# Patient Record
Sex: FEMALE | Race: WHITE | HISPANIC OR LATINO | Employment: UNEMPLOYED | URBAN - METROPOLITAN AREA
[De-identification: names, ages, dates, MRNs, and addresses within clinical notes are randomized per-mention and may not be internally consistent; named-entity substitution may affect disease eponyms.]

---
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
## 2018-04-12 ENCOUNTER — HOSPITAL ENCOUNTER (OUTPATIENT)
Dept: LAB | Facility: MEDICAL CENTER | Age: 18
End: 2018-04-12
Attending: EMERGENCY MEDICINE
Payer: MEDICAID

## 2018-04-12 LAB — B-HCG SERPL-ACNC: ABNORMAL MIU/ML (ref 0–5)

## 2018-04-12 PROCEDURE — 36415 COLL VENOUS BLD VENIPUNCTURE: CPT

## 2018-04-12 PROCEDURE — 84702 CHORIONIC GONADOTROPIN TEST: CPT

## 2018-05-21 ENCOUNTER — INITIAL PRENATAL (OUTPATIENT)
Dept: OBGYN | Facility: CLINIC | Age: 18
End: 2018-05-21
Payer: MEDICAID

## 2018-05-21 ENCOUNTER — HOSPITAL ENCOUNTER (OUTPATIENT)
Dept: LAB | Facility: MEDICAL CENTER | Age: 18
End: 2018-05-21
Attending: NURSE PRACTITIONER
Payer: MEDICAID

## 2018-05-21 VITALS
HEIGHT: 64 IN | SYSTOLIC BLOOD PRESSURE: 120 MMHG | DIASTOLIC BLOOD PRESSURE: 60 MMHG | BODY MASS INDEX: 27.49 KG/M2 | WEIGHT: 161 LBS

## 2018-05-21 DIAGNOSIS — F12.91 HISTORY OF MARIJUANA USE: ICD-10-CM

## 2018-05-21 DIAGNOSIS — Z34.80 SUPERVISION OF OTHER NORMAL PREGNANCY, ANTEPARTUM: ICD-10-CM

## 2018-05-21 DIAGNOSIS — Z34.80 SUPERVISION OF OTHER NORMAL PREGNANCY, ANTEPARTUM: Primary | ICD-10-CM

## 2018-05-21 DIAGNOSIS — F15.91 HISTORY OF METHAMPHETAMINE USE: ICD-10-CM

## 2018-05-21 PROBLEM — L50.9 URTICARIA: Status: RESOLVED | Noted: 2017-12-13 | Resolved: 2018-05-21

## 2018-05-21 PROBLEM — O03.6 SPONTANEOUS ABORTION COMPLICATED BY DELAYED OR EXCESSIVE HEMORRHAGE: Status: RESOLVED | Noted: 2017-12-13 | Resolved: 2018-05-21

## 2018-05-21 LAB
ABO GROUP BLD: NORMAL
APPEARANCE UR: CLEAR
APPEARANCE UR: NORMAL
BACTERIA #/AREA URNS HPF: NEGATIVE /HPF
BASOPHILS # BLD AUTO: 0.2 % (ref 0–1.8)
BASOPHILS # BLD: 0.02 K/UL (ref 0–0.12)
BILIRUB UR QL STRIP.AUTO: NEGATIVE
BILIRUB UR STRIP-MCNC: NORMAL MG/DL
BLD GP AB SCN SERPL QL: NORMAL
CAOX CRY #/AREA URNS HPF: ABNORMAL /HPF
COLOR UR AUTO: NORMAL
COLOR UR: YELLOW
EOSINOPHIL # BLD AUTO: 0.06 K/UL (ref 0–0.51)
EOSINOPHIL NFR BLD: 0.5 % (ref 0–6.9)
EPI CELLS #/AREA URNS HPF: ABNORMAL /HPF
ERYTHROCYTE [DISTWIDTH] IN BLOOD BY AUTOMATED COUNT: 54.2 FL (ref 35.9–50)
GLUCOSE UR STRIP.AUTO-MCNC: NEGATIVE MG/DL
GLUCOSE UR STRIP.AUTO-MCNC: NEGATIVE MG/DL
HBV SURFACE AG SER QL: NEGATIVE
HCT VFR BLD AUTO: 38.5 % (ref 37–47)
HGB BLD-MCNC: 12 G/DL (ref 12–16)
HIV 1+2 AB+HIV1 P24 AG SERPL QL IA: NON REACTIVE
HYALINE CASTS #/AREA URNS LPF: ABNORMAL /LPF
IMM GRANULOCYTES # BLD AUTO: 0.04 K/UL (ref 0–0.11)
IMM GRANULOCYTES NFR BLD AUTO: 0.3 % (ref 0–0.9)
KETONES UR STRIP.AUTO-MCNC: NEGATIVE MG/DL
KETONES UR STRIP.AUTO-MCNC: NEGATIVE MG/DL
LEUKOCYTE ESTERASE UR QL STRIP.AUTO: ABNORMAL
LEUKOCYTE ESTERASE UR QL STRIP.AUTO: NORMAL
LYMPHOCYTES # BLD AUTO: 2.28 K/UL (ref 1–4.8)
LYMPHOCYTES NFR BLD: 18.9 % (ref 22–41)
MCH RBC QN AUTO: 24.3 PG (ref 27–33)
MCHC RBC AUTO-ENTMCNC: 31.2 G/DL (ref 33.6–35)
MCV RBC AUTO: 78.1 FL (ref 81.4–97.8)
MICRO URNS: ABNORMAL
MONOCYTES # BLD AUTO: 1.11 K/UL (ref 0–0.85)
MONOCYTES NFR BLD AUTO: 9.2 % (ref 0–13.4)
NEUTROPHILS # BLD AUTO: 8.55 K/UL (ref 2–7.15)
NEUTROPHILS NFR BLD: 70.9 % (ref 44–72)
NITRITE UR QL STRIP.AUTO: NEGATIVE
NITRITE UR QL STRIP.AUTO: NEGATIVE
NRBC # BLD AUTO: 0 K/UL
NRBC BLD-RTO: 0 /100 WBC
PH UR STRIP.AUTO: 6 [PH]
PH UR STRIP.AUTO: 6.5 [PH] (ref 5–8)
PLATELET # BLD AUTO: 302 K/UL (ref 164–446)
PMV BLD AUTO: 9.6 FL (ref 9–12.9)
PROT UR QL STRIP: NEGATIVE MG/DL
PROT UR QL STRIP: NEGATIVE MG/DL
RBC # BLD AUTO: 4.93 M/UL (ref 4.2–5.4)
RBC # URNS HPF: ABNORMAL /HPF
RBC UR QL AUTO: NEGATIVE
RBC UR QL AUTO: NEGATIVE
RH BLD: NORMAL
RUBV AB SER QL: 8.2 IU/ML
SP GR UR STRIP.AUTO: 1.02
SP GR UR STRIP.AUTO: 1.02
TREPONEMA PALLIDUM IGG+IGM AB [PRESENCE] IN SERUM OR PLASMA BY IMMUNOASSAY: NON REACTIVE
UROBILINOGEN UR STRIP-MCNC: NORMAL MG/DL
UROBILINOGEN UR STRIP.AUTO-MCNC: 0.2 MG/DL
WBC # BLD AUTO: 12.1 K/UL (ref 4.8–10.8)
WBC #/AREA URNS HPF: ABNORMAL /HPF

## 2018-05-21 PROCEDURE — 81002 URINALYSIS NONAUTO W/O SCOPE: CPT | Performed by: NURSE PRACTITIONER

## 2018-05-21 PROCEDURE — 59401 PR NEW OB VISIT: CPT | Performed by: NURSE PRACTITIONER

## 2018-05-21 PROCEDURE — 81001 URINALYSIS AUTO W/SCOPE: CPT

## 2018-05-21 PROCEDURE — 86850 RBC ANTIBODY SCREEN: CPT

## 2018-05-21 PROCEDURE — 87340 HEPATITIS B SURFACE AG IA: CPT

## 2018-05-21 PROCEDURE — 86901 BLOOD TYPING SEROLOGIC RH(D): CPT

## 2018-05-21 PROCEDURE — 87389 HIV-1 AG W/HIV-1&-2 AB AG IA: CPT

## 2018-05-21 PROCEDURE — 86900 BLOOD TYPING SEROLOGIC ABO: CPT

## 2018-05-21 PROCEDURE — 86762 RUBELLA ANTIBODY: CPT

## 2018-05-21 PROCEDURE — 87591 N.GONORRHOEAE DNA AMP PROB: CPT

## 2018-05-21 PROCEDURE — 85025 COMPLETE CBC W/AUTO DIFF WBC: CPT

## 2018-05-21 PROCEDURE — 86780 TREPONEMA PALLIDUM: CPT

## 2018-05-21 PROCEDURE — 36415 COLL VENOUS BLD VENIPUNCTURE: CPT

## 2018-05-21 PROCEDURE — 87491 CHLMYD TRACH DNA AMP PROBE: CPT

## 2018-05-21 ASSESSMENT — ENCOUNTER SYMPTOMS
CONSTITUTIONAL NEGATIVE: 1
GASTROINTESTINAL NEGATIVE: 1
MUSCULOSKELETAL NEGATIVE: 1
NEUROLOGICAL NEGATIVE: 1
CARDIOVASCULAR NEGATIVE: 1
RESPIRATORY NEGATIVE: 1
EYES NEGATIVE: 1
PSYCHIATRIC NEGATIVE: 1

## 2018-05-21 NOTE — LETTER
Cystic Fibrosis Carrier Testing  Peggy Pollard    The following information is about a blood test that can be done to determine if you and/or your partner carry the gene for cystic fibrosis.    WHAT IS CYSTIC FIBROSIS?  · Cystic fibrosis (CF) is an inherited disease that affects more than 25,000 American children and young adults.  · Symptoms of CF vary but include lung congestion, pneumonia, diarrhea and poor growth.  Most people with CF have severe medical problems and some die at a young age.  Others have so few symptoms they are unaware they have CF.  · CF does not affect intelligence.  · Although there is no cure for CF at this time, scientists are making progress in improving treatment and in searching for a cure.  In the past many people with CF  at a very young age.  Today, many are living into their 20’s and 30’s.    IS THERE A CHANCE MY BABY COULD HAVE CYSTIC FIBROSIS?  · You can have a child with CF even if there is no history in your family (see chart below).  · CF testing can help determine if you are a carrier and at risk to have a child with CF.  Note: if both parents are carriers, there is a 1 in 4 (25%) chance with each pregnancy that they will have a child with CF.  · Carriers have one normal CF gene and one altered CF gene.  · People with CF have two altered CF genes.  · Most people have two normal copies of the CF gene.    Approximate risk that a couple with no family history of cystic fibrosis will have a child with cystic fibrosis:    Ethnic background / Risk     couple:  1 in 2,500   couple:  1 in 15,000            couple:  1 in 8,000     American couple:  1 in 32,000     WHAT TESTING IS AVAILABLE?  · There is a blood test that can be done to find out if you or your partner is a carrier.  · It is important to understand that CF carrier testing does not detect all CF carriers.  · If the test shows that you are both CF carriers, you  unborn baby can be tested to find out if the baby has CF.    HOW MUCH DOES IT COST TO HAVE CYSTIC FIBROSIS CARRIER TESTING?  · Cost and insurance coverage for CF carrier testing vary depending upon the laboratory used and your insurance policy.  · The average cost for CF carrier testing is $300 per person.  · Your genetic counselor can provide you with more information about cystic fibrosis carrier testing.    _____  Yes, I am interested in discussing carrier testing with a genetic counselor.    _____  No, I am not interested in CF carrier testing or in receiving more information about CF carrier testing.      Client signature: ________________________________________  5/21/2018

## 2018-05-21 NOTE — PROGRESS NOTES
S:  Peggy Pollard is a 18 y.o.  who presents for her new OB exam.  She is 12w2d with and ANNIE of Estimated Date of Delivery: 18 based off of LMP . She has no complaints.  She is currently working at [employer]. Discussed heavy lifting and chemical exposure. No ER visits or previous care in this pregnancy.     Unsure about AFP.  Declines CF.  Denies VB, LOF, or cramping.  Denies dysuria, vaginal DC. Reports some fetal movement.     Pt is single and lives with boyfriend.  Pregnancy is planned and desired.    Patient states she had a SAB 2017 which did not require surgical intervention, but did have to take medication and was supposed to follow up with MD, but never did.      States she uses marijuana and sometimes methamphetamines. Last used last night. States she will never use again. She is afraid of CPS involvement. Discussed  and CPS involvement at hospital and what to expect. Encouraged patient to stop using all illegal substances.    Discussed diet and exercise during pregnancy. Encouraged good nutrition, and daily exercise including walking or swimming. Discussed expected weight gain during pregnancy. Discussed adequate hydration during pregnancy.    Past Medical History:   Diagnosis Date   • Substance abuse     last used marijuana yesterday after had stopped for 1 month. pt states she had done methe  few times states she used meth yesterday 18.      Family History   Problem Relation Age of Onset   • Diabetes Father      Social History     Social History   • Marital status: Single     Spouse name: N/A   • Number of children: N/A   • Years of education: 10     Occupational History   •   [employer]     Social History Main Topics   • Smoking status: Never Smoker   • Smokeless tobacco: Never Used      Comment: marijuana, currently   • Alcohol use No   • Drug use: Yes     Types: Inhaled, Marijuana, Methamphetamines      Comment: pt states last time  "she used meth and marijuana was yesterday 18   • Sexual activity: Yes     Partners: Male      Comment: Planned pregnancy     Other Topics Concern   • Not on file     Social History Narrative    ** Merged History Encounter **          OB History    Para Term  AB Living   2 0 0 0 1     SAB TAB Ectopic Molar Multiple Live Births   0 0 0 0          # Outcome Date GA Lbr Jose/2nd Weight Sex Delivery Anes PTL Lv   2 Current            1 AB 2017 7w0d             Birth Comments: passed on its own          History of Varicella Virus: yes  History of HSV I or II in self or partner: no  History of Thyroid problems: no    O:  Blood pressure 120/60, height 1.625 m (5' 3.98\"), weight 73 kg (161 lb), last menstrual period 2018.   See Prenatal Physical.    Wet mount: deferred, no s/sx      A:   1.  IUP @ 12w2d per LMP        2.  S=D        3.  See problem list below        4.  History of drug use- methamphetamine and marijuana     There are no active problems to display for this patient.        P:  1.  GC/CT done, no pap due to age        2.  Prenatal labs ordered - lab slip given        3.  Discussed PNV, diet, avoidances and adequate water intake        4.  NOB packet given        5.  Return to office in 4 wks        6.  Complete OB US in 7-8 wks        7.  AFP at next visit    No orders of the defined types were placed in this encounter.      HPI    Review of Systems   Constitutional: Negative.    HENT: Negative.    Eyes: Negative.    Respiratory: Negative.    Cardiovascular: Negative.    Gastrointestinal: Negative.    Genitourinary: Negative.    Musculoskeletal: Negative.    Skin: Negative.    Neurological: Negative.    Endo/Heme/Allergies: Negative.    Psychiatric/Behavioral: Negative.    All other systems reviewed and are negative.         Objective:     /60   Ht 1.625 m (5' 3.98\")   Wt 73 kg (161 lb)   LMP 2018   BMI 27.66 kg/m²      Physical Exam   Constitutional: She is oriented " to person, place, and time. She appears well-developed and well-nourished.   HENT:   Head: Normocephalic.   Nose: Nose normal.   Eyes: Conjunctivae and EOM are normal.   Neck: Normal range of motion.   Cardiovascular: Normal rate, regular rhythm, normal heart sounds and intact distal pulses.    Pulmonary/Chest: Effort normal and breath sounds normal.   Abdominal: Soft. Bowel sounds are normal.   Genitourinary: Vagina normal and uterus normal.   Musculoskeletal: Normal range of motion.   Neurological: She is alert and oriented to person, place, and time.   Skin: Skin is warm and dry. Capillary refill takes less than 2 seconds.   Psychiatric: She has a normal mood and affect. Her behavior is normal. Judgment and thought content normal.   Nursing note and vitals reviewed.       Assessment/Plan:     1. Supervision of other normal pregnancy, antepartum  ANNIE 12/1/18 per LMP  - US-OB 2ND 3RD TRI COMPLETE; Future  - PREG CNTR PRENATAL PN; Future  - CHLAMYDIA/GC PCR URINE OR SWAB; Future  - POCT Urinalysis

## 2018-05-21 NOTE — PROGRESS NOTES
Pt here today for NOB visit  LMP: 02/24/2018  WT: 161 lb  BP: 120/60  Pt states no complaints today  Desires AFP  Good # [phone]

## 2018-05-22 PROBLEM — O09.899 RUBELLA NON-IMMUNE STATUS, ANTEPARTUM: Status: ACTIVE | Noted: 2018-05-22

## 2018-05-22 PROBLEM — Z28.39 RUBELLA NON-IMMUNE STATUS, ANTEPARTUM: Status: ACTIVE | Noted: 2018-05-22

## 2018-05-22 LAB
C TRACH DNA SPEC QL NAA+PROBE: NEGATIVE
N GONORRHOEA DNA SPEC QL NAA+PROBE: NEGATIVE
SPECIMEN SOURCE: NORMAL

## 2018-06-18 ENCOUNTER — ROUTINE PRENATAL (OUTPATIENT)
Dept: OBGYN | Facility: CLINIC | Age: 18
End: 2018-06-18
Payer: MEDICAID

## 2018-06-18 VITALS — WEIGHT: 167 LBS | BODY MASS INDEX: 28.69 KG/M2 | DIASTOLIC BLOOD PRESSURE: 64 MMHG | SYSTOLIC BLOOD PRESSURE: 122 MMHG

## 2018-06-18 DIAGNOSIS — Z34.90 ENCOUNTER FOR SUPERVISION OF NORMAL PREGNANCY, ANTEPARTUM, UNSPECIFIED GRAVIDITY: Primary | ICD-10-CM

## 2018-06-18 PROCEDURE — 90040 PR PRENATAL FOLLOW UP: CPT | Performed by: NURSE PRACTITIONER

## 2018-06-18 NOTE — PROGRESS NOTES
Pt here today for OB follow up  Reports +FM  WT: 167 lb  BP: 122/64  US on 07/19/18  Pt states no concerns today  Desires AFP testing.  Good # [phone]

## 2018-06-18 NOTE — PROGRESS NOTES
SUBJECTIVE:  Pt is a 18 y.o.   at 16w2d  gestation. Presents today for follow-up prenatal care. Reports no issues at this time.  States not smoking methamphetamine anymore. Reports now feeling some slight  fetal movement. Denies cramping/contractions, bleeding or leaking of fluid. Denies dysuria, headaches, N/V, or other issues at this time. Generally feels well today.     OBJECTIVE:  - See prenatal vitals flow  -   Vitals:    18 0941   BP: 122/64   Weight: 75.8 kg (167 lb)      Labs - normal pnp  US scheduled.            ASSESSMENT:   - IUP at 16w2d    - S=D   -   Patient Active Problem List    Diagnosis Date Noted   • Rubella non-immune status, antepartum 2018   • Supervision of other normal pregnancy, antepartum 2018   • History of marijuana use 2018   • History of methamphetamine use 2018         PLAN:  - S/sx pregnancy and labor warning signs vs general discomforts discussed  - Fetal movements and kick counts reviewed   - Adequate hydration reinforced  AFP slip with instructions given.

## 2018-06-19 ENCOUNTER — HOSPITAL ENCOUNTER (OUTPATIENT)
Dept: LAB | Facility: MEDICAL CENTER | Age: 18
End: 2018-06-19
Attending: NURSE PRACTITIONER
Payer: MEDICAID

## 2018-06-19 DIAGNOSIS — Z34.90 ENCOUNTER FOR SUPERVISION OF NORMAL PREGNANCY, ANTEPARTUM, UNSPECIFIED GRAVIDITY: ICD-10-CM

## 2018-06-19 PROCEDURE — 81511 FTL CGEN ABNOR FOUR ANAL: CPT

## 2018-06-19 PROCEDURE — 36415 COLL VENOUS BLD VENIPUNCTURE: CPT

## 2018-06-21 LAB
# FETUSES US: NORMAL
AFP MOM SERPL: 0.62
AFP SERPL-MCNC: 20 NG/ML
AGE - REPORTED: 20.7 YR
CURRENT SMOKER: NO
FAMILY MEMBER DISEASES HX: NO
GA METHOD: NORMAL
GA: NORMAL WK
HCG MOM SERPL: 1.03
HCG SERPL-ACNC: NORMAL IU/L
HX OF HEREDITARY DISORDERS: NO
IDDM PATIENT QL: NO
INHIBIN A MOM SERPL: 1.36
INHIBIN A SERPL-MCNC: 215 PG/ML
INTEGRATED SCN PATIENT-IMP: NORMAL
PATHOLOGY STUDY: NORMAL
SPECIMEN DRAWN SERPL: NORMAL
U ESTRIOL MOM SERPL: 1.11
U ESTRIOL SERPL-MCNC: 1.07 NG/ML

## 2018-07-19 ENCOUNTER — ROUTINE PRENATAL (OUTPATIENT)
Dept: OBGYN | Facility: CLINIC | Age: 18
End: 2018-07-19
Payer: MEDICAID

## 2018-07-19 ENCOUNTER — APPOINTMENT (OUTPATIENT)
Dept: RADIOLOGY | Facility: IMAGING CENTER | Age: 18
End: 2018-07-19
Attending: NURSE PRACTITIONER
Payer: MEDICAID

## 2018-07-19 VITALS — WEIGHT: 175 LBS | SYSTOLIC BLOOD PRESSURE: 110 MMHG | BODY MASS INDEX: 30.06 KG/M2 | DIASTOLIC BLOOD PRESSURE: 64 MMHG

## 2018-07-19 DIAGNOSIS — Z34.80 SUPERVISION OF OTHER NORMAL PREGNANCY, ANTEPARTUM: ICD-10-CM

## 2018-07-19 PROCEDURE — 76805 OB US >/= 14 WKS SNGL FETUS: CPT | Performed by: OBSTETRICS & GYNECOLOGY

## 2018-07-19 PROCEDURE — 90040 PR PRENATAL FOLLOW UP: CPT | Performed by: NURSE PRACTITIONER

## 2018-07-19 NOTE — PROGRESS NOTES
Pt here today for OB follow up  Pt states no complaints   Reports +  Good # [phone]  Pharmacy Confirmed.  Pt has u/s today

## 2018-07-19 NOTE — PROGRESS NOTES
SUBJECTIVE:  Pt is a 18 y.o.   at 20w5d  gestation. Presents today for follow-up prenatal care. Reports no issues at this time.  States clean from drugs and alcohol and happy with new job. Reports good  fetal movement. Denies cramping/contractions, bleeding or leaking of fluid. Denies dysuria, headaches, N/V, or other issues at this time. Generally feels well today.     OBJECTIVE:  - See prenatal vitals flow  -   Vitals:    18 1401   BP: 110/64   Weight: 79.4 kg (175 lb)      Labs - normal prenatal labs  US - pending today           ASSESSMENT:   - IUP at 20w5d    - S=D   -   Patient Active Problem List    Diagnosis Date Noted   • Rubella non-immune status, antepartum 2018   • Supervision of other normal pregnancy, antepartum 2018   • History of marijuana use 2018   • History of methamphetamine use 2018         PLAN:  - S/sx pregnancy and labor warning signs vs general discomforts discussed  - Fetal movements and kick counts reviewed   - Adequate hydration reinforced  - Nutrition/exercise/vitamin education; continued PNV  - US appt today.

## 2018-07-20 PROBLEM — O43.119 CIRCUMVALLATE PLACENTA: Status: ACTIVE | Noted: 2018-07-20

## 2018-07-24 ENCOUNTER — TELEPHONE (OUTPATIENT)
Dept: OBGYN | Facility: CLINIC | Age: 18
End: 2018-07-24

## 2018-07-24 NOTE — TELEPHONE ENCOUNTER
Pt called back, advised she needs to have a f/u u/s to get a better view of baby's heart. Pt verbalized understanding and transferred to [facility].   ----- Message from Rekha Manuel M.D. sent at 7/20/2018  4:57 PM PDT -----  Please inform patient I have ordered a repeat US to be done in 2 weeks for a better view of the fetal heart.  7/24/18@11:50 am. N/a, msg left for patient to call back.

## 2018-08-07 ENCOUNTER — APPOINTMENT (OUTPATIENT)
Dept: RADIOLOGY | Facility: IMAGING CENTER | Age: 18
End: 2018-08-07
Attending: OBSTETRICS & GYNECOLOGY
Payer: MEDICAID

## 2018-08-07 DIAGNOSIS — O28.3 ABNORMAL PREGNANCY US: ICD-10-CM

## 2018-08-07 PROCEDURE — 76815 OB US LIMITED FETUS(S): CPT | Mod: 26 | Performed by: OBSTETRICS & GYNECOLOGY

## 2018-08-14 PROBLEM — O28.3 ABNORMAL PREGNANCY US: Status: ACTIVE | Noted: 2018-08-14

## 2018-08-16 ENCOUNTER — ROUTINE PRENATAL (OUTPATIENT)
Dept: OBGYN | Facility: CLINIC | Age: 18
End: 2018-08-16
Payer: MEDICAID

## 2018-08-16 VITALS — WEIGHT: 181 LBS | BODY MASS INDEX: 31.09 KG/M2 | SYSTOLIC BLOOD PRESSURE: 120 MMHG | DIASTOLIC BLOOD PRESSURE: 62 MMHG

## 2018-08-16 DIAGNOSIS — Z34.80 SUPERVISION OF OTHER NORMAL PREGNANCY, ANTEPARTUM: Primary | ICD-10-CM

## 2018-08-16 PROCEDURE — 90040 PR PRENATAL FOLLOW UP: CPT | Performed by: NURSE PRACTITIONER

## 2018-08-16 NOTE — PATIENT INSTRUCTIONS
P:  1. Questions answered.           2. Encouraged adequate water intake        3.   labor precautions reviewed.        4.  F/u 4 wks.        5.  US results reviewed w pt.         6.  28wk labs ordered.

## 2018-08-16 NOTE — PROGRESS NOTES
Pt. Here for OB/FU today. Reports Good FM.   Good # [phone]  Pt states no complaints.   Pt would like to get results on U/S done on 8/7/18.  Pharmacy verified.   Pt given 1 HR GTT and RPR lab slip today along with instructions.

## 2018-08-16 NOTE — PROGRESS NOTES
S:  Pt is  at 24w5d here for routine OB follow up.  No c/o today.  Reports good FM.  Denies VB, RUCs, LOF or vaginal DC.    O:  Please see above vitals.        FHTs: 150        Fundal ht: 24 cm.    Limited OB US    2018 3:31 PM    HISTORY/REASON FOR EXAM:  Evaluate fetal heart (not well seen on prior study)    TECHNIQUE/EXAM DESCRIPTION: OB limited ultrasound.    COMPARISON:  Obstetrical ultrasound 2018    FINDINGS:  Fetal Lie:  Vertex  LMP:  2018  Clinical ANNIE by LMP:  2018    Placenta (Location):  Posterior right  Placenta Previa: No  Placental Grade: I    Amniotic Fluid Volume:  KATEY = 22.06 cm    Fetal Heart Rate:  149 bpm    Cervical Length:  4.04 cm transabdominal    No maternal adnexal mass is identified.    ANNIE by 1st US:  12/3/2018    Comments:  Question prominent foramen ovale flap again seen. Circumvallate placenta again seen.   Impression       1.  Single intrauterine pregnancy with an estimated date of delivery of 12/3/2018.  2.  Prominent foramen ovale flap again seen.  3.  Circumvallate placenta again seen.         A:  IUP at 24w5d  Patient Active Problem List    Diagnosis Date Noted   • Abnormal pregnancy US 2018   • Circumvallate placenta 2018   • Rubella non-immune status, antepartum 2018   • Supervision of other normal pregnancy, antepartum 2018   • History of marijuana use 2018   • History of methamphetamine use 2018       P:  1. Questions answered.           2. Encouraged adequate water intake        3.   labor precautions reviewed.        4.  F/u 4 wks.        5.  US results reviewed w pt.         6.  28wk labs ordered.

## 2018-08-29 ENCOUNTER — HOSPITAL ENCOUNTER (OUTPATIENT)
Dept: LAB | Facility: MEDICAL CENTER | Age: 18
End: 2018-08-29
Attending: NURSE PRACTITIONER
Payer: MEDICAID

## 2018-08-29 DIAGNOSIS — Z34.80 SUPERVISION OF OTHER NORMAL PREGNANCY, ANTEPARTUM: ICD-10-CM

## 2018-08-29 LAB
GLUCOSE 1H P 50 G GLC PO SERPL-MCNC: 115 MG/DL (ref 70–139)
HCT VFR BLD AUTO: 36.1 % (ref 37–47)
HGB BLD-MCNC: 11.4 G/DL (ref 12–16)
TREPONEMA PALLIDUM IGG+IGM AB [PRESENCE] IN SERUM OR PLASMA BY IMMUNOASSAY: NON REACTIVE

## 2018-08-29 PROCEDURE — 85018 HEMOGLOBIN: CPT

## 2018-08-29 PROCEDURE — 85014 HEMATOCRIT: CPT

## 2018-08-29 PROCEDURE — 82950 GLUCOSE TEST: CPT

## 2018-08-29 PROCEDURE — 36415 COLL VENOUS BLD VENIPUNCTURE: CPT

## 2018-08-29 PROCEDURE — 86780 TREPONEMA PALLIDUM: CPT

## 2018-09-13 ENCOUNTER — ROUTINE PRENATAL (OUTPATIENT)
Dept: OBGYN | Facility: CLINIC | Age: 18
End: 2018-09-13
Payer: MEDICAID

## 2018-09-13 VITALS — SYSTOLIC BLOOD PRESSURE: 132 MMHG | BODY MASS INDEX: 31.95 KG/M2 | WEIGHT: 186 LBS | DIASTOLIC BLOOD PRESSURE: 62 MMHG

## 2018-09-13 DIAGNOSIS — Z34.80 SUPERVISION OF OTHER NORMAL PREGNANCY, ANTEPARTUM: Primary | ICD-10-CM

## 2018-09-13 PROCEDURE — 90040 PR PRENATAL FOLLOW UP: CPT | Performed by: NURSE PRACTITIONER

## 2018-09-13 PROCEDURE — 90471 IMMUNIZATION ADMIN: CPT | Performed by: NURSE PRACTITIONER

## 2018-09-13 PROCEDURE — 90715 TDAP VACCINE 7 YRS/> IM: CPT | Performed by: NURSE PRACTITIONER

## 2018-09-13 NOTE — PROGRESS NOTES
Pt. Here for OB/F/U today Kick Count sheet given and explained to pt.   Good FM  Good # [phone]  Pt states having some pelvic pressure.   Pharmacy verified.   Tdap vaccine given today, right deltoid. Screening checklist reviewed with pt verified by Winnie Horton C.N.M.

## 2018-09-13 NOTE — PROGRESS NOTES
S) Pt is a 18 y.o.   at 28w5d  gestation. Routine prenatal care today. No complaints today. Says her pelvis hurts sometimes when she is walking. Discussed normalcy of this. Tdap today, declines BTL.  labor precautions reviewed, all questions answered. Desires genetic testing to find out who the father is. Discussed doing this after delivery in pediatricians office. States that her ex is trying to take her to court regarding his paternity interest in this child.   Fetal movement Normal  Cramping no  VB no  LOF no   Denies dysuria. Generally feels well today. Good self-care activities identified. Denies headaches, swelling, visual changes, or epigastric pain .     O) Blood pressure 132/62, weight 84.4 kg (186 lb), last menstrual period 2018.        Labs:       PNL: WNL       GCT: 115        AFP: normal       GBS: N/A       Pertinent ultrasound -        18- Survey with poor views of the heart, remainder WNL. KATEY 18.32cm, circumvallate placenta noted, c/w prev dating. Recommend recheck of heart in 2 weeks.  18- Survey with Foramen ovale flap again noted, remainder WNL, KATEY 22.06cm, circumvallate placenta noted, c/w prev dating. No follow up recommended.    A) IUP at 28w5d       S=D         Patient Active Problem List    Diagnosis Date Noted   • Abnormal pregnancy US 2018   • Circumvallate placenta 2018   • Rubella non-immune status, antepartum 2018   • Supervision of other normal pregnancy, antepartum 2018   • History of marijuana use 2018   • History of methamphetamine use 2018          SVE: deferred       Chaperone offered: n/a         TDAP: yes       FLU: no        BTL: no       : n/a       C/S Consent: n/a       IOL or C/S scheduled: no       LAST PAP: deferred due to age         P) s/s ptl vs general discomforts. Fetal movements reviewed. General ed and anticipatory guidance. Nutrition/exercise/vitamin. Plans breast Plans pp contraception- unsure   Continue PNV.

## 2018-09-13 NOTE — LETTER
"Count Your Baby's Movements  Another step to a healthy delivery    Peggy Pollard             Dept: [phone]        How Many Weeks Pregnant 28w5d        Date to Begin Countin18       How to use this chart    One way for your physician to keep track of your baby's health is by knowing how often the baby moves (or \"kicks\") in your womb.  You can help your physician to do this by using this chart every day.    Every day, you should see how many hours it takes for your baby to move 10 times.  Start in the morning, as soon as you get up.    · First, write down the time your baby moves until you get to 10.  · Check off one box every time your baby moves until you get to 10.  · Write down the time you finished counting in the last column.  · Total how long it took to count up all 10 movements.  · Finally, fill in the box that shows how long this took.  After counting 10 movements, you no longer have to count any more that day.  The next morning, just start counting again as soon as you get up.    What should you call a \"movement\"?  It is hard to say, because it will feel different from one mother to another and from one pregnancy to the next.  The important thing is that you count the movements the same way throughout your pregnancy.  If you have more questions, you should ask your physician.    Count carefully every day!  SAMPLE:  Week 28    How many hours did it take to feel 10 movements?       Start  Time     1     2     3     4     5     6     7     8     9     10   Finish Time   Mon 8:20 ·  ·  ·  ·  ·  ·  ·  ·  ·  ·  11:40   Tue               Wed               Thu               Fri               Sat               Sun                 IMPORTANT: You should contact your physician if it takes more than two hours for you to feel 10 movements.  Each morning, write down the time and start to count the movements of your baby.  Keep track by checking off one box every time you feel one movement.  When you " "have felt 10 \"kicks\", write down the time you finished counting in the last column.  Then fill in the   box (over the check francine) for the number of hours it took.  Be sure to read the complete instructions on the previous page.            "

## 2018-09-27 ENCOUNTER — ROUTINE PRENATAL (OUTPATIENT)
Dept: OBGYN | Facility: CLINIC | Age: 18
End: 2018-09-27
Payer: MEDICAID

## 2018-09-27 VITALS — BODY MASS INDEX: 32.64 KG/M2 | DIASTOLIC BLOOD PRESSURE: 68 MMHG | SYSTOLIC BLOOD PRESSURE: 114 MMHG | WEIGHT: 190 LBS

## 2018-09-27 DIAGNOSIS — O43.113 CIRCUMVALLATE PLACENTA IN THIRD TRIMESTER: ICD-10-CM

## 2018-09-27 DIAGNOSIS — Z34.80 SUPERVISION OF OTHER NORMAL PREGNANCY, ANTEPARTUM: Primary | ICD-10-CM

## 2018-09-27 PROCEDURE — 90040 PR PRENATAL FOLLOW UP: CPT | Performed by: NURSE PRACTITIONER

## 2018-09-27 NOTE — PATIENT INSTRUCTIONS
P:  1.  PP contraception: pills and condoms.        2.  Continue FKCs.          3.  Questions answered.          4.  Encouraged pt to tour L&D.          5.  Encourage adequate water intake.        6.  F/u 2 wks.        7.  Tdap already completed.          8.  F/U US 2wks per Dr. Manuel.        9.  Repeated H/H.  Lab slip given to pt.

## 2018-09-27 NOTE — PROGRESS NOTES
S:  Pt is  at 30w5d for routine OB follow up.  Reports that [facility] said [facility] was anemia - reports they gave a value of 8-something, our last H/H is wnl - will re-check.  Reports good FM.  Denies VB, LOF, RUCs or vaginal DC.    O:  Please see above vitals.        FHTs: 155        Fundal ht: 31 cm.        1hr GTT: wnl -- reviewed w pt.    A:  IUP at 30w5d  Patient Active Problem List    Diagnosis Date Noted   • Abnormal pregnancy US 2018   • Circumvallate placenta 2018   • Rubella non-immune status, antepartum 2018   • Supervision of other normal pregnancy, antepartum 2018   • History of marijuana use 2018   • History of methamphetamine use 2018        P:  1.  PP contraception: pills and condoms.        2.  Continue FKCs.          3.  Questions answered.          4.  Encouraged pt to tour L&D.          5.  Encourage adequate water intake.        6.  F/u 2 wks.        7.  Tdap already completed.          8.  F/U US 2wks per Dr. Manuel.        9.  Repeated H/H.  Lab slip given to pt.

## 2018-09-27 NOTE — PROGRESS NOTES
Pt here today for OB follow up  Pt states [facility] told her she was anemic.   Reports +  Good # [phone]  Pharmacy Confirmed.

## 2018-10-08 ENCOUNTER — HOSPITAL ENCOUNTER (OUTPATIENT)
Dept: LAB | Facility: MEDICAL CENTER | Age: 18
End: 2018-10-08
Attending: NURSE PRACTITIONER
Payer: MEDICAID

## 2018-10-08 DIAGNOSIS — Z34.80 SUPERVISION OF OTHER NORMAL PREGNANCY, ANTEPARTUM: ICD-10-CM

## 2018-10-08 LAB
HCT VFR BLD AUTO: 33.6 % (ref 37–47)
HGB BLD-MCNC: 10.2 G/DL (ref 12–16)

## 2018-10-08 PROCEDURE — 85018 HEMOGLOBIN: CPT

## 2018-10-08 PROCEDURE — 36415 COLL VENOUS BLD VENIPUNCTURE: CPT

## 2018-10-08 PROCEDURE — 85014 HEMATOCRIT: CPT

## 2018-10-09 ENCOUNTER — TELEPHONE (OUTPATIENT)
Dept: OBGYN | Facility: CLINIC | Age: 18
End: 2018-10-09

## 2018-10-09 NOTE — TELEPHONE ENCOUNTER
Pt notified of need to start on Iron supplementation to take 325 mg BID. Recommended to take it with orange juice, to avoid milk products 1hr before and 2hr after. Not to take with PNV. To increase water intake to decrease side effects of constipation. Pt verbalized understanding.

## 2018-10-09 NOTE — TELEPHONE ENCOUNTER
LM for pt to call back regarding Iron BID      --- Message from Winnie Horton C.N.M. sent at 10/9/2018  8:35 AM PDT -----  Please have patient start Iron BID. Thank you!

## 2018-10-10 ENCOUNTER — ROUTINE PRENATAL (OUTPATIENT)
Dept: OBGYN | Facility: CLINIC | Age: 18
End: 2018-10-10
Payer: MEDICAID

## 2018-10-10 ENCOUNTER — APPOINTMENT (OUTPATIENT)
Dept: RADIOLOGY | Facility: IMAGING CENTER | Age: 18
End: 2018-10-10
Attending: NURSE PRACTITIONER
Payer: MEDICAID

## 2018-10-10 ENCOUNTER — DATING (OUTPATIENT)
Dept: OBGYN | Facility: CLINIC | Age: 18
End: 2018-10-10

## 2018-10-10 VITALS — WEIGHT: 195 LBS | SYSTOLIC BLOOD PRESSURE: 120 MMHG | BODY MASS INDEX: 33.5 KG/M2 | DIASTOLIC BLOOD PRESSURE: 60 MMHG

## 2018-10-10 DIAGNOSIS — Z34.80 SUPERVISION OF OTHER NORMAL PREGNANCY, ANTEPARTUM: Primary | ICD-10-CM

## 2018-10-10 DIAGNOSIS — Z23 NEED FOR PROPHYLACTIC VACCINATION AND INOCULATION AGAINST INFLUENZA: ICD-10-CM

## 2018-10-10 DIAGNOSIS — O43.113 CIRCUMVALLATE PLACENTA IN THIRD TRIMESTER: ICD-10-CM

## 2018-10-10 DIAGNOSIS — Z34.80 SUPERVISION OF OTHER NORMAL PREGNANCY, ANTEPARTUM: ICD-10-CM

## 2018-10-10 DIAGNOSIS — O99.013 ANEMIA IN PREGNANCY, THIRD TRIMESTER: ICD-10-CM

## 2018-10-10 PROCEDURE — 90471 IMMUNIZATION ADMIN: CPT | Performed by: NURSE PRACTITIONER

## 2018-10-10 PROCEDURE — 90040 PR PRENATAL FOLLOW UP: CPT | Performed by: NURSE PRACTITIONER

## 2018-10-10 PROCEDURE — 76816 OB US FOLLOW-UP PER FETUS: CPT | Mod: TC | Performed by: NURSE PRACTITIONER

## 2018-10-10 PROCEDURE — 90686 IIV4 VACC NO PRSV 0.5 ML IM: CPT | Performed by: NURSE PRACTITIONER

## 2018-10-10 NOTE — PATIENT INSTRUCTIONS
P:  1.  GBS @ 35 wks.          2.  Continue FKCs.          3.  Questions answered.          4.  Encouraged pt to tour L&D.          5.  Encourage adequate water intake.        6.  F/u 2 wks.        7.  PP contraception pills and condoms.        8.  Flu vaccine given.        9.  Keep US appt today.       10.  Continue iron BID.

## 2018-10-10 NOTE — PROGRESS NOTES
Pt here today for OB follow up  Reports +FM  WT: 195 lb  BP: 120/60  Pt states no complaints today  Desires the influenza avaccine  Good # [phone]    influenza vaccine given. Right Deltoid. VIS given and screening check list reviewed with pt.  Influenza vaccine verified by Cheryl Zurita (MA)

## 2018-10-10 NOTE — PROGRESS NOTES
S:  Pt is  at 32w4d for routine OB follow up.  No c/o today.  Reports good FM.  Denies VB, LOF, RUCs or vaginal DC.    O:  Please see above vitals.        FHTs: 155        Fundal ht: 33 cm.    A:  IUP at 32w4d  Patient Active Problem List    Diagnosis Date Noted   • Abnormal pregnancy US 2018   • Circumvallate placenta 2018   • Rubella non-immune status, antepartum 2018   • Supervision of other normal pregnancy, antepartum 2018   • History of marijuana use 2018   • History of methamphetamine use 2018        P:  1.  GBS @ 35 wks.          2.  Continue FKCs.          3.  Questions answered.          4.  Encouraged pt to tour L&D.          5.  Encourage adequate water intake.        6.  F/u 2 wks.        7.  PP contraception pills and condoms.        8.  Flu vaccine given.        9.  Keep US appt today.       10.  Continue iron BID.     I have placed the below orders and discussed them with an approved delegating provider. The MA is performing the below orders under the direction of  Dr. Garcia.

## 2018-10-24 ENCOUNTER — ROUTINE PRENATAL (OUTPATIENT)
Dept: OBGYN | Facility: CLINIC | Age: 18
End: 2018-10-24
Payer: MEDICAID

## 2018-10-24 VITALS — DIASTOLIC BLOOD PRESSURE: 70 MMHG | WEIGHT: 201 LBS | BODY MASS INDEX: 34.53 KG/M2 | SYSTOLIC BLOOD PRESSURE: 124 MMHG

## 2018-10-24 DIAGNOSIS — Z34.80 SUPERVISION OF OTHER NORMAL PREGNANCY, ANTEPARTUM: Primary | ICD-10-CM

## 2018-10-24 PROCEDURE — 90040 PR PRENATAL FOLLOW UP: CPT | Performed by: NURSE PRACTITIONER

## 2018-10-24 NOTE — PROGRESS NOTES
Pt here today for OB follow up  Pt states legs and fingers are swollen.  Reports +FM   Good # [phone]  Pharmacy Confirmed.  Chaperone offered and not indicated.

## 2018-10-24 NOTE — PROGRESS NOTES
S) Pt is a 18 y.o.   at 34w4d  gestation. Routine prenatal care today. Complains of some increased swelling at the end of the day, discussed normalcy of this. Recommend compression socks, increased hydration, and elevate feet at the end of the day. No PIH symptoms, BP WNL. Labor precautions discussed, all questions answered. GBS at next visit.    Fetal movement Normal  Cramping no  VB no  LOF no   Denies dysuria. Generally feels well today. Good self-care activities identified. Denies headaches, swelling, visual changes, or epigastric pain .     O) Blood pressure 124/70, weight 91.2 kg (201 lb), last menstrual period 2018, not currently breastfeeding.        Labs:       PNL: WNL       GCT: 115        AFP: normal       GBS: N/A       Pertinent ultrasound -        18- Survey with poor views of the heart, remainder WNL. KATEY 18.32cm, circumvallate placenta noted, c/w prev dating. Recommend recheck of heart in 2 weeks.  18- Survey with Foramen ovale flap again noted, remainder WNL, KATEY 22.06cm, circumvallate placenta noted, c/w prev dating. No follow up recommended.    A) IUP at 34w4d       S=D         Patient Active Problem List    Diagnosis Date Noted   • Anemia in pregnancy, third trimester 10/10/2018   • Abnormal pregnancy US 2018   • Circumvallate placenta 2018   • Rubella non-immune status, antepartum 2018   • Supervision of other normal pregnancy, antepartum 2018   • History of marijuana use 2018   • History of methamphetamine use 2018          SVE: deferred       Chaperone offered: n/a         TDAP: yes       FLU: yes        BTL: no       : n/a       C/S Consent: n/a       IOL or C/S scheduled: no       LAST PAP: deferred due to age         P) s/s ptl vs general discomforts. Fetal movements reviewed. General ed and anticipatory guidance. Nutrition/exercise/vitamin. Plans breast Plans pp contraception- unsure  Continue PNV.

## 2018-10-31 ENCOUNTER — HOSPITAL ENCOUNTER (OUTPATIENT)
Facility: MEDICAL CENTER | Age: 18
End: 2018-10-31
Attending: NURSE PRACTITIONER
Payer: MEDICAID

## 2018-10-31 ENCOUNTER — ROUTINE PRENATAL (OUTPATIENT)
Dept: OBGYN | Facility: CLINIC | Age: 18
End: 2018-10-31
Payer: MEDICAID

## 2018-10-31 VITALS — SYSTOLIC BLOOD PRESSURE: 122 MMHG | WEIGHT: 207 LBS | DIASTOLIC BLOOD PRESSURE: 80 MMHG | BODY MASS INDEX: 35.56 KG/M2

## 2018-10-31 DIAGNOSIS — O09.93 ENCOUNTER FOR SUPERVISION OF HIGH RISK PREGNANCY IN THIRD TRIMESTER, ANTEPARTUM: ICD-10-CM

## 2018-10-31 DIAGNOSIS — O09.93 ENCOUNTER FOR SUPERVISION OF HIGH RISK PREGNANCY IN THIRD TRIMESTER, ANTEPARTUM: Primary | ICD-10-CM

## 2018-10-31 PROCEDURE — 87081 CULTURE SCREEN ONLY: CPT

## 2018-10-31 PROCEDURE — 90040 PR PRENATAL FOLLOW UP: CPT | Performed by: NURSE PRACTITIONER

## 2018-10-31 PROCEDURE — 87150 DNA/RNA AMPLIFIED PROBE: CPT

## 2018-10-31 NOTE — PROGRESS NOTES
Pt here today for OB follow up  Pt states no complaints   Reports +FM  Good # [phone]  Pharmacy Confirmed.  Chaperone offered and declined.   GBS today

## 2018-10-31 NOTE — PROGRESS NOTES
S:  Pt is  at 35w4d here for routine OB follow up.  No concerns today.  Reports good FM.  Denies VB, LOF, RUCs, or vaginal DC.     O:  Please see above vitals.        FHTs: 148        Fundal ht: 36 cm.        S=D        Fetal position: vertex.    A:  IUP at 35w4d  Patient Active Problem List    Diagnosis Date Noted   • Anemia in pregnancy, third trimester 10/10/2018   • Abnormal pregnancy US 2018   • Circumvallate placenta 2018   • Rubella non-immune status, antepartum 2018   • Supervision of other normal pregnancy, antepartum 2018   • History of marijuana use 2018   • History of methamphetamine use 2018       P:  1.  GBS obtained.          2.  Labor precautions given.  Instructions given on where to go.  Pt receptive to              education.          3.  Questions answered.          4.  D/w pt policies about GBS.        5.  Continue FKCs.          6.  Encouraged adequate water intake        7.  F/u 1 wk.

## 2018-11-02 LAB — GP B STREP DNA SPEC QL NAA+PROBE: NEGATIVE

## 2018-11-07 ENCOUNTER — ROUTINE PRENATAL (OUTPATIENT)
Dept: OBGYN | Facility: CLINIC | Age: 18
End: 2018-11-07
Payer: MEDICAID

## 2018-11-07 VITALS — WEIGHT: 214 LBS | SYSTOLIC BLOOD PRESSURE: 128 MMHG | DIASTOLIC BLOOD PRESSURE: 78 MMHG | BODY MASS INDEX: 36.76 KG/M2

## 2018-11-07 DIAGNOSIS — Z34.83 ENCOUNTER FOR SUPERVISION OF OTHER NORMAL PREGNANCY, THIRD TRIMESTER: ICD-10-CM

## 2018-11-07 PROCEDURE — 90040 PR PRENATAL FOLLOW UP: CPT | Performed by: NURSE PRACTITIONER

## 2018-11-07 NOTE — PROGRESS NOTES
SUBJECTIVE:  Pt is a 18 y.o.   at 36w4d  gestation. Presents today for follow-up prenatal care. Reports bilateral pedal and hand edema. Discomfort when walking.  Reports good  fetal movement. Denies cramping/contractions, bleeding or leaking of fluid. Denies dysuria, headaches, N/V, or other issues at this time. Generally feels well today.     OBJECTIVE:  - See prenatal vitals flow  -   Vitals:    18 1109   BP: 128/78   Weight: 97.1 kg (214 lb)      Bilateral LE edema noted. No erythema, streaking, warmth. Negative Gm's.            ASSESSMENT:   - IUP at 36w4d    - S=D   -   Patient Active Problem List    Diagnosis Date Noted   • Anemia in pregnancy, third trimester 10/10/2018   • Abnormal pregnancy US 2018   • Circumvallate placenta 2018   • Rubella non-immune status, antepartum 2018   • Supervision of other normal pregnancy, antepartum 2018   • History of marijuana use 2018   • History of methamphetamine use 2018         PLAN:  - S/sx pregnancy and labor warning signs vs general discomforts discussed  - Fetal movements and kick counts reviewed   - Adequate hydration reinforced  - Nutrition/exercise/vitamin education; continued PNV  S/s DVT vs generalized edema discussed. Desires OCP postpartum

## 2018-11-07 NOTE — PROGRESS NOTES
Pt here today for OB follow up  Pt states no complaints  Reports +  Good # [phone]  Pharmacy Confirmed.  Chaperone offered and none needed.

## 2018-11-25 ENCOUNTER — HOSPITAL ENCOUNTER (INPATIENT)
Facility: MEDICAL CENTER | Age: 18
LOS: 5 days | End: 2018-11-30
Attending: OBSTETRICS & GYNECOLOGY | Admitting: OBSTETRICS & GYNECOLOGY
Payer: MEDICAID

## 2018-11-25 LAB
ALBUMIN SERPL BCP-MCNC: 2.8 G/DL (ref 3.2–4.9)
ALBUMIN/GLOB SERPL: 1.2 G/DL
ALP SERPL-CCNC: 173 U/L (ref 30–99)
ALT SERPL-CCNC: 7 U/L (ref 2–50)
AMPHET UR QL SCN: NEGATIVE
ANION GAP SERPL CALC-SCNC: 9 MMOL/L (ref 0–11.9)
ANISOCYTOSIS BLD QL SMEAR: ABNORMAL
APPEARANCE UR: CLEAR
AST SERPL-CCNC: 20 U/L (ref 12–45)
BACTERIA #/AREA URNS HPF: NEGATIVE /HPF
BARBITURATES UR QL SCN: NEGATIVE
BASOPHILS # BLD AUTO: 0.2 % (ref 0–1.8)
BASOPHILS # BLD: 0.03 K/UL (ref 0–0.12)
BENZODIAZ UR QL SCN: NEGATIVE
BILIRUB SERPL-MCNC: 0.3 MG/DL (ref 0.1–1.5)
BILIRUB UR QL STRIP.AUTO: NEGATIVE
BUN SERPL-MCNC: 8 MG/DL (ref 8–22)
BZE UR QL SCN: NEGATIVE
CALCIUM SERPL-MCNC: 8.4 MG/DL (ref 8.5–10.5)
CANNABINOIDS UR QL SCN: NEGATIVE
CHLORIDE SERPL-SCNC: 108 MMOL/L (ref 96–112)
CO2 SERPL-SCNC: 18 MMOL/L (ref 20–33)
COLOR UR: YELLOW
COMMENT 1642: NORMAL
CREAT SERPL-MCNC: 0.56 MG/DL (ref 0.5–1.4)
CRYSTALS AMN MICRO: NORMAL
EOSINOPHIL # BLD AUTO: 0.06 K/UL (ref 0–0.51)
EOSINOPHIL NFR BLD: 0.4 % (ref 0–6.9)
EPI CELLS #/AREA URNS HPF: ABNORMAL /HPF
GLOBULIN SER CALC-MCNC: 2.3 G/DL (ref 1.9–3.5)
GLUCOSE SERPL-MCNC: 83 MG/DL (ref 65–99)
GLUCOSE UR STRIP.AUTO-MCNC: NEGATIVE MG/DL
HCT VFR BLD AUTO: 35.5 % (ref 37–47)
HGB BLD-MCNC: 11.8 G/DL (ref 12–16)
HOLDING TUBE BB 8507: NORMAL
HYALINE CASTS #/AREA URNS LPF: ABNORMAL /LPF
IMM GRANULOCYTES # BLD AUTO: 0.1 K/UL (ref 0–0.11)
IMM GRANULOCYTES NFR BLD AUTO: 0.7 % (ref 0–0.9)
KETONES UR STRIP.AUTO-MCNC: NEGATIVE MG/DL
LEUKOCYTE ESTERASE UR QL STRIP.AUTO: NEGATIVE
LYMPHOCYTES # BLD AUTO: 1.68 K/UL (ref 1–4.8)
LYMPHOCYTES NFR BLD: 12.2 % (ref 22–41)
MAGNESIUM SERPL-MCNC: 3.2 MG/DL (ref 1.5–2.5)
MAGNESIUM SERPL-MCNC: 5.3 MG/DL (ref 1.5–2.5)
MCH RBC QN AUTO: 26.8 PG (ref 27–33)
MCHC RBC AUTO-ENTMCNC: 33.2 G/DL (ref 33.6–35)
MCV RBC AUTO: 80.5 FL (ref 81.4–97.8)
METHADONE UR QL SCN: NEGATIVE
MICRO URNS: ABNORMAL
MICROCYTES BLD QL SMEAR: ABNORMAL
MONOCYTES # BLD AUTO: 1.68 K/UL (ref 0–0.85)
MONOCYTES NFR BLD AUTO: 12.2 % (ref 0–13.4)
MORPHOLOGY BLD-IMP: NORMAL
NEUTROPHILS # BLD AUTO: 10.26 K/UL (ref 2–7.15)
NEUTROPHILS NFR BLD: 74.3 % (ref 44–72)
NITRITE UR QL STRIP.AUTO: NEGATIVE
NRBC # BLD AUTO: 0 K/UL
NRBC BLD-RTO: 0 /100 WBC
OPIATES UR QL SCN: NEGATIVE
OXYCODONE UR QL SCN: NEGATIVE
PCP UR QL SCN: NEGATIVE
PH UR STRIP.AUTO: 6.5 [PH]
PLATELET # BLD AUTO: 169 K/UL (ref 164–446)
PLATELET BLD QL SMEAR: NORMAL
PMV BLD AUTO: 9.9 FL (ref 9–12.9)
POTASSIUM SERPL-SCNC: 4 MMOL/L (ref 3.6–5.5)
PROPOXYPH UR QL SCN: NEGATIVE
PROT SERPL-MCNC: 5.1 G/DL (ref 6–8.2)
PROT UR QL STRIP: 300 MG/DL
RBC # BLD AUTO: 4.41 M/UL (ref 4.2–5.4)
RBC # URNS HPF: ABNORMAL /HPF
RBC BLD AUTO: PRESENT
RBC UR QL AUTO: ABNORMAL
SODIUM SERPL-SCNC: 135 MMOL/L (ref 135–145)
SP GR UR STRIP.AUTO: 1.01
URATE SERPL-MCNC: 6.2 MG/DL (ref 1.9–8.2)
UROBILINOGEN UR STRIP.AUTO-MCNC: 0.2 MG/DL
WBC # BLD AUTO: 13.8 K/UL (ref 4.8–10.8)
WBC #/AREA URNS HPF: ABNORMAL /HPF

## 2018-11-25 PROCEDURE — 85025 COMPLETE CBC W/AUTO DIFF WBC: CPT

## 2018-11-25 PROCEDURE — 770002 HCHG ROOM/CARE - OB PRIVATE (112)

## 2018-11-25 PROCEDURE — 81001 URINALYSIS AUTO W/SCOPE: CPT

## 2018-11-25 PROCEDURE — 3E033VJ INTRODUCTION OF OTHER HORMONE INTO PERIPHERAL VEIN, PERCUTANEOUS APPROACH: ICD-10-PCS | Performed by: OBSTETRICS & GYNECOLOGY

## 2018-11-25 PROCEDURE — 89060 EXAM SYNOVIAL FLUID CRYSTALS: CPT

## 2018-11-25 PROCEDURE — 700111 HCHG RX REV CODE 636 W/ 250 OVERRIDE (IP): Performed by: OBSTETRICS & GYNECOLOGY

## 2018-11-25 PROCEDURE — 700102 HCHG RX REV CODE 250 W/ 637 OVERRIDE(OP): Performed by: ANESTHESIOLOGY

## 2018-11-25 PROCEDURE — 700111 HCHG RX REV CODE 636 W/ 250 OVERRIDE (IP)

## 2018-11-25 PROCEDURE — 700105 HCHG RX REV CODE 258: Performed by: OBSTETRICS & GYNECOLOGY

## 2018-11-25 PROCEDURE — 36415 COLL VENOUS BLD VENIPUNCTURE: CPT

## 2018-11-25 PROCEDURE — 304964 HCHG RECOVERY ROOM TIME 1HR: Performed by: OBSTETRICS & GYNECOLOGY

## 2018-11-25 PROCEDURE — 305385 HCHG SURGICAL SERVICES 1/4 HOUR: Performed by: OBSTETRICS & GYNECOLOGY

## 2018-11-25 PROCEDURE — 80053 COMPREHEN METABOLIC PANEL: CPT

## 2018-11-25 PROCEDURE — 84550 ASSAY OF BLOOD/URIC ACID: CPT

## 2018-11-25 PROCEDURE — 80307 DRUG TEST PRSMV CHEM ANLYZR: CPT

## 2018-11-25 PROCEDURE — 700111 HCHG RX REV CODE 636 W/ 250 OVERRIDE (IP): Performed by: ANESTHESIOLOGY

## 2018-11-25 PROCEDURE — 83735 ASSAY OF MAGNESIUM: CPT | Mod: 91

## 2018-11-25 PROCEDURE — 59510 CESAREAN DELIVERY: CPT | Performed by: OBSTETRICS & GYNECOLOGY

## 2018-11-25 PROCEDURE — A9270 NON-COVERED ITEM OR SERVICE: HCPCS | Performed by: ANESTHESIOLOGY

## 2018-11-25 PROCEDURE — 59514 CESAREAN DELIVERY ONLY: CPT | Mod: AS | Performed by: NURSE PRACTITIONER

## 2018-11-25 PROCEDURE — 700101 HCHG RX REV CODE 250

## 2018-11-25 PROCEDURE — 306828 HCHG ANES-TIME GENERAL: Performed by: OBSTETRICS & GYNECOLOGY

## 2018-11-25 RX ORDER — LABETALOL HYDROCHLORIDE 5 MG/ML
INJECTION, SOLUTION INTRAVENOUS
Status: DISCONTINUED
Start: 2018-11-25 | End: 2018-11-26

## 2018-11-25 RX ORDER — HYDRALAZINE HYDROCHLORIDE 20 MG/ML
5-10 INJECTION INTRAMUSCULAR; INTRAVENOUS PRN
Status: DISCONTINUED | OUTPATIENT
Start: 2018-11-25 | End: 2018-11-26

## 2018-11-25 RX ORDER — HYDROXYZINE 50 MG/1
50 TABLET, FILM COATED ORAL EVERY 6 HOURS PRN
Status: DISCONTINUED | OUTPATIENT
Start: 2018-11-25 | End: 2018-11-26 | Stop reason: HOSPADM

## 2018-11-25 RX ORDER — ROPIVACAINE HYDROCHLORIDE 2 MG/ML
INJECTION, SOLUTION EPIDURAL; INFILTRATION; PERINEURAL CONTINUOUS
Status: DISCONTINUED | OUTPATIENT
Start: 2018-11-25 | End: 2018-11-26

## 2018-11-25 RX ORDER — ROPIVACAINE HYDROCHLORIDE 2 MG/ML
INJECTION, SOLUTION EPIDURAL; INFILTRATION; PERINEURAL CONTINUOUS
Status: DISCONTINUED | OUTPATIENT
Start: 2018-11-25 | End: 2018-11-25

## 2018-11-25 RX ORDER — SODIUM CHLORIDE, SODIUM LACTATE, POTASSIUM CHLORIDE, CALCIUM CHLORIDE 600; 310; 30; 20 MG/100ML; MG/100ML; MG/100ML; MG/100ML
INJECTION, SOLUTION INTRAVENOUS CONTINUOUS
Status: DISCONTINUED | OUTPATIENT
Start: 2018-11-25 | End: 2018-11-30 | Stop reason: HOSPADM

## 2018-11-25 RX ORDER — CITRIC ACID/SODIUM CITRATE 334-500MG
30 SOLUTION, ORAL ORAL ONCE
Status: COMPLETED | OUTPATIENT
Start: 2018-11-25 | End: 2018-11-25

## 2018-11-25 RX ORDER — SODIUM CHLORIDE, SODIUM GLUCONATE, SODIUM ACETATE, POTASSIUM CHLORIDE AND MAGNESIUM CHLORIDE 526; 502; 368; 37; 30 MG/100ML; MG/100ML; MG/100ML; MG/100ML; MG/100ML
1500 INJECTION, SOLUTION INTRAVENOUS ONCE
Status: DISCONTINUED | OUTPATIENT
Start: 2018-11-25 | End: 2018-11-26 | Stop reason: HOSPADM

## 2018-11-25 RX ORDER — MAGNESIUM SULFATE HEPTAHYDRATE 40 MG/ML
2 INJECTION, SOLUTION INTRAVENOUS CONTINUOUS
Status: DISCONTINUED | OUTPATIENT
Start: 2018-11-25 | End: 2018-11-26

## 2018-11-25 RX ORDER — SODIUM CHLORIDE, SODIUM LACTATE, POTASSIUM CHLORIDE, AND CALCIUM CHLORIDE .6; .31; .03; .02 G/100ML; G/100ML; G/100ML; G/100ML
250 INJECTION, SOLUTION INTRAVENOUS PRN
Status: DISCONTINUED | OUTPATIENT
Start: 2018-11-25 | End: 2018-11-25

## 2018-11-25 RX ORDER — MAGNESIUM SULFATE HEPTAHYDRATE 40 MG/ML
INJECTION, SOLUTION INTRAVENOUS
Status: COMPLETED
Start: 2018-11-25 | End: 2018-11-25

## 2018-11-25 RX ORDER — KETOROLAC TROMETHAMINE 30 MG/ML
30 INJECTION, SOLUTION INTRAMUSCULAR; INTRAVENOUS EVERY 6 HOURS
Status: COMPLETED | OUTPATIENT
Start: 2018-11-26 | End: 2018-11-26

## 2018-11-25 RX ORDER — CITRIC ACID/SODIUM CITRATE 334-500MG
30 SOLUTION, ORAL ORAL EVERY 6 HOURS PRN
Status: DISCONTINUED | OUTPATIENT
Start: 2018-11-25 | End: 2018-11-26 | Stop reason: HOSPADM

## 2018-11-25 RX ORDER — HYDROMORPHONE HYDROCHLORIDE 1 MG/ML
0.4 INJECTION, SOLUTION INTRAMUSCULAR; INTRAVENOUS; SUBCUTANEOUS
Status: DISPENSED | OUTPATIENT
Start: 2018-11-25 | End: 2018-11-26

## 2018-11-25 RX ORDER — MAGNESIUM SULFATE HEPTAHYDRATE 40 MG/ML
4 INJECTION, SOLUTION INTRAVENOUS ONCE
Status: COMPLETED | OUTPATIENT
Start: 2018-11-25 | End: 2018-11-25

## 2018-11-25 RX ORDER — SODIUM CHLORIDE, SODIUM LACTATE, POTASSIUM CHLORIDE, AND CALCIUM CHLORIDE .6; .31; .03; .02 G/100ML; G/100ML; G/100ML; G/100ML
1000 INJECTION, SOLUTION INTRAVENOUS
Status: DISCONTINUED | OUTPATIENT
Start: 2018-11-25 | End: 2018-11-25

## 2018-11-25 RX ORDER — FERROUS GLUCONATE 324(38)MG
648 TABLET ORAL
Status: ON HOLD | COMMUNITY
End: 2018-11-30

## 2018-11-25 RX ORDER — SODIUM CHLORIDE, SODIUM LACTATE, POTASSIUM CHLORIDE, AND CALCIUM CHLORIDE .6; .31; .03; .02 G/100ML; G/100ML; G/100ML; G/100ML
1000 INJECTION, SOLUTION INTRAVENOUS
Status: DISCONTINUED | OUTPATIENT
Start: 2018-11-25 | End: 2018-11-26 | Stop reason: HOSPADM

## 2018-11-25 RX ORDER — SODIUM CHLORIDE, SODIUM LACTATE, POTASSIUM CHLORIDE, AND CALCIUM CHLORIDE .6; .31; .03; .02 G/100ML; G/100ML; G/100ML; G/100ML
250 INJECTION, SOLUTION INTRAVENOUS PRN
Status: DISCONTINUED | OUTPATIENT
Start: 2018-11-25 | End: 2018-11-26 | Stop reason: HOSPADM

## 2018-11-25 RX ORDER — DIPHENHYDRAMINE HYDROCHLORIDE 50 MG/ML
25 INJECTION INTRAMUSCULAR; INTRAVENOUS EVERY 6 HOURS PRN
Status: ACTIVE | OUTPATIENT
Start: 2018-11-25 | End: 2018-11-26

## 2018-11-25 RX ORDER — HYDRALAZINE HYDROCHLORIDE 20 MG/ML
INJECTION INTRAMUSCULAR; INTRAVENOUS
Status: COMPLETED
Start: 2018-11-25 | End: 2018-11-25

## 2018-11-25 RX ORDER — BUPIVACAINE HYDROCHLORIDE 2.5 MG/ML
INJECTION, SOLUTION EPIDURAL; INFILTRATION; INTRACAUDAL
Status: ACTIVE
Start: 2018-11-25 | End: 2018-11-26

## 2018-11-25 RX ORDER — DIPHENHYDRAMINE HYDROCHLORIDE 50 MG/ML
12.5 INJECTION INTRAMUSCULAR; INTRAVENOUS EVERY 6 HOURS PRN
Status: ACTIVE | OUTPATIENT
Start: 2018-11-25 | End: 2018-11-26

## 2018-11-25 RX ORDER — ALUMINA, MAGNESIA, AND SIMETHICONE 2400; 2400; 240 MG/30ML; MG/30ML; MG/30ML
30 SUSPENSION ORAL EVERY 6 HOURS PRN
Status: DISCONTINUED | OUTPATIENT
Start: 2018-11-25 | End: 2018-11-26 | Stop reason: HOSPADM

## 2018-11-25 RX ORDER — ROPIVACAINE HYDROCHLORIDE 2 MG/ML
INJECTION, SOLUTION EPIDURAL; INFILTRATION; PERINEURAL
Status: COMPLETED
Start: 2018-11-25 | End: 2018-11-25

## 2018-11-25 RX ORDER — OXYCODONE HYDROCHLORIDE 5 MG/1
5 TABLET ORAL EVERY 4 HOURS PRN
Status: ACTIVE | OUTPATIENT
Start: 2018-11-25 | End: 2018-11-26

## 2018-11-25 RX ORDER — METOCLOPRAMIDE HYDROCHLORIDE 5 MG/ML
10 INJECTION INTRAMUSCULAR; INTRAVENOUS ONCE
Status: COMPLETED | OUTPATIENT
Start: 2018-11-25 | End: 2018-11-25

## 2018-11-25 RX ORDER — OXYCODONE HYDROCHLORIDE 10 MG/1
10 TABLET ORAL EVERY 4 HOURS PRN
Status: DISPENSED | OUTPATIENT
Start: 2018-11-25 | End: 2018-11-26

## 2018-11-25 RX ORDER — ACETAMINOPHEN 500 MG
1000 TABLET ORAL EVERY 6 HOURS
Status: COMPLETED | OUTPATIENT
Start: 2018-11-25 | End: 2018-11-26

## 2018-11-25 RX ORDER — LABETALOL HYDROCHLORIDE 5 MG/ML
20-80 INJECTION, SOLUTION INTRAVENOUS PRN
Status: DISCONTINUED | OUTPATIENT
Start: 2018-11-25 | End: 2018-11-26

## 2018-11-25 RX ORDER — HYDROMORPHONE HYDROCHLORIDE 1 MG/ML
0.2 INJECTION, SOLUTION INTRAMUSCULAR; INTRAVENOUS; SUBCUTANEOUS
Status: ACTIVE | OUTPATIENT
Start: 2018-11-25 | End: 2018-11-26

## 2018-11-25 RX ADMIN — HYDRALAZINE HYDROCHLORIDE 5 MG: 20 INJECTION INTRAMUSCULAR; INTRAVENOUS at 13:17

## 2018-11-25 RX ADMIN — HYDRALAZINE HYDROCHLORIDE 10 MG: 20 INJECTION INTRAMUSCULAR; INTRAVENOUS at 13:45

## 2018-11-25 RX ADMIN — MAGNESIUM SULFATE IN WATER 20 G: 40 INJECTION, SOLUTION INTRAVENOUS at 13:51

## 2018-11-25 RX ADMIN — SODIUM CHLORIDE, POTASSIUM CHLORIDE, SODIUM LACTATE AND CALCIUM CHLORIDE: 600; 310; 30; 20 INJECTION, SOLUTION INTRAVENOUS at 15:36

## 2018-11-25 RX ADMIN — MAGNESIUM SULFATE IN WATER 4 G: 40 INJECTION, SOLUTION INTRAVENOUS at 13:30

## 2018-11-25 RX ADMIN — KETOROLAC TROMETHAMINE 30 MG: 30 INJECTION, SOLUTION INTRAMUSCULAR at 23:26

## 2018-11-25 RX ADMIN — ROPIVACAINE HYDROCHLORIDE: 2 INJECTION, SOLUTION EPIDURAL; INFILTRATION; PERINEURAL at 16:20

## 2018-11-25 RX ADMIN — FAMOTIDINE 20 MG: 10 INJECTION INTRAVENOUS at 19:20

## 2018-11-25 RX ADMIN — SODIUM CHLORIDE, POTASSIUM CHLORIDE, SODIUM LACTATE AND CALCIUM CHLORIDE: 600; 310; 30; 20 INJECTION, SOLUTION INTRAVENOUS at 13:30

## 2018-11-25 RX ADMIN — SODIUM CITRATE AND CITRIC ACID MONOHYDRATE 30 ML: 500; 334 SOLUTION ORAL at 19:20

## 2018-11-25 RX ADMIN — Medication 1 MILLI-UNITS/MIN: at 14:14

## 2018-11-25 RX ADMIN — MAGNESIUM SULFATE IN WATER 2 G/HR: 40 INJECTION, SOLUTION INTRAVENOUS at 22:34

## 2018-11-25 RX ADMIN — HYDROMORPHONE HYDROCHLORIDE 0.4 MG: 1 INJECTION, SOLUTION INTRAMUSCULAR; INTRAVENOUS; SUBCUTANEOUS at 22:15

## 2018-11-25 RX ADMIN — METOCLOPRAMIDE 10 MG: 5 INJECTION, SOLUTION INTRAMUSCULAR; INTRAVENOUS at 19:21

## 2018-11-25 RX ADMIN — OXYCODONE HYDROCHLORIDE 10 MG: 10 TABLET ORAL at 21:37

## 2018-11-25 RX ADMIN — ACETAMINOPHEN 1000 MG: 500 TABLET, FILM COATED ORAL at 21:38

## 2018-11-25 RX ADMIN — ROPIVACAINE HYDROCHLORIDE: 2 INJECTION, SOLUTION EPIDURAL; INFILTRATION at 16:20

## 2018-11-25 RX ADMIN — MAGNESIUM SULFATE HEPTAHYDRATE 4 G: 40 INJECTION, SOLUTION INTRAVENOUS at 13:30

## 2018-11-25 ASSESSMENT — PAIN SCALES - GENERAL
PAINLEVEL_OUTOF10: 0
PAINLEVEL_OUTOF10: 6
PAINLEVEL_OUTOF10: 0
PAINLEVEL_OUTOF10: 6
PAINLEVEL_OUTOF10: 0

## 2018-11-25 ASSESSMENT — PATIENT HEALTH QUESTIONNAIRE - PHQ9
SUM OF ALL RESPONSES TO PHQ9 QUESTIONS 1 AND 2: 0
2. FEELING DOWN, DEPRESSED, IRRITABLE, OR HOPELESS: NOT AT ALL
1. LITTLE INTEREST OR PLEASURE IN DOING THINGS: NOT AT ALL

## 2018-11-25 ASSESSMENT — LIFESTYLE VARIABLES: EVER_SMOKED: NEVER

## 2018-11-25 NOTE — PROGRESS NOTES
1240-pt presents from home with c/o leaking fluid x1 week, states that she was unsure if she was peeing, no c/o bleeding, pain or uc's, states baby is moving normally, placed on external monitors, vs taken and set for q 10 min due to elevation, SSE performed, pooling noted, fern slide prepared and sent, pt denies RUQ pain, states that she had a headache and visual changes yesterday but not at this time, pt has 2+ pitting edema, normal reflexes and 1 beat of clonus in both feet  1250-TC Dr Manuel, report given, admission order received, hypertension protocol ordered  1320-iv started, hydralazine started, report given to LORE Jha RN, POC discussed, transferred to D

## 2018-11-25 NOTE — H&P
History and Physical      Peggy Pollard is a 18 y.o. year old female  at 39w1d who presents for leaking of fluid for 1 week.  Noted to have severe range -170/110-119.  No headache, visual changes, RUQ pain.  + LE edema.      Subjective:   positive  For CTXS.   negative Feels pain   positive for LOF  negative for vaginal bleeding.   positive for fetal movement    ROS: Pertinent items are noted in HPI.    Past Medical History:   Diagnosis Date   • Anemia in pregnancy, third trimester 10/10/2018   • Substance abuse (HCC)     last used marijuana yesterday after had stopped for 1 month. pt states she had done methe  few times states she used meth yesterday 18.      No past surgical history on file.  OB History    Para Term  AB Living   2 0 0 0 1     SAB TAB Ectopic Molar Multiple Live Births   0 0 0 0          # Outcome Date GA Lbr Jose/2nd Weight Sex Delivery Anes PTL Lv   2 Current            1 AB 2017 7w0d             Birth Comments: passed on its own        Social History     Social History   • Marital status: Single     Spouse name: N/A   • Number of children: N/A   • Years of education: 10     Occupational History   •   [employer]     Social History Main Topics   • Smoking status: Never Smoker   • Smokeless tobacco: Never Used      Comment: marijuana, currently   • Alcohol use No   • Drug use: Yes     Types: Inhaled, Marijuana, Methamphetamines      Comment: pt states last time she used meth and marijuana was yesterday 18   • Sexual activity: Yes     Partners: Male     Birth control/ protection: Pill, Condom      Comment: Plans pills and condoms PP.     Other Topics Concern   • Not on file     Social History Narrative    ** Merged History Encounter **          Allergies: Patient has no known allergies.    Current Facility-Administered Medications:   •  labetalol (NORMODYNE,TRANDATE) injection 20-80 mg, 20-80 mg, Intravenous, PRN **OR**  "hydrALAZINE (APRESOLINE) injection 5-10 mg, 5-10 mg, Intravenous, PRN, Rekha Manuel M.D.  •  MAGNESIUM SULFATE 4 GM/100ML IV SOLN, , , ,   •  MAGNESIUM SULFATE 20 GM/500ML IV SOLN, , , ,   •  LABETALOL HCL 5 MG/ML IV SOLN, , , ,     Prenatal care with [facility] starting at 12 wks with following problems:  Patient Active Problem List    Diagnosis Date Noted   • Anemia in pregnancy, third trimester 10/10/2018   • Abnormal pregnancy US 08/14/2018   • Circumvallate placenta 07/20/2018   • Rubella non-immune status, antepartum 05/22/2018   • Supervision of other normal pregnancy, antepartum 05/21/2018   • History of marijuana use 05/21/2018   • History of methamphetamine use 05/21/2018               Objective:      Blood pressure (!) 176/119, pulse (!) 106, height 1.626 m (5' 4\"), weight 97.1 kg (214 lb), last menstrual period 02/24/2018, not currently breastfeeding.    General:   no acute distress   Skin:   normal   HEENT:  PERRLA   Lungs:   CTA bilateral   Heart:   S1, S2 normal, no murmur, click, rub or gallop, regular rate and rhythm, brisk carotid upstroke without bruits, peripheral pulses very brisk, chest is clear without rales or wheezing, no pedal edema, no JVD, no hepatosplenomegaly   Abdomen:   gravid, NT   EFW:  3500 grams   Pelvis:  adequate with gynecoid pelvis   FHT:  130s BPM, moderate variability, + accels   Uterine Size: S=D   Presentations: Cephalic   Cervix:     Dilation: 1cm    Effacement: 50%    Station:  -2    Consistency: Medium    Position: Anterior   EXT - 2+ edema to knees bilaterally.  Reflexes 2+.  1 beat of clonus present.     Lab Review  Lab:   Blood type: O     Recent Results (from the past 5880 hour(s))   HCG QUANTITATIVE    Collection Time: 04/12/18  3:52 PM   Result Value Ref Range    Bhcg 43681.7 (H) 0.0 - 5.0 mIU/mL   POCT Urinalysis    Collection Time: 05/21/18  9:09 AM   Result Value Ref Range    POC Color  Negative    POC Appearance  Negative    POC Leukocyte Esterase Trace Negative    " POC Nitrites NEgative Negative    POC Urobiligen  Negative (0.2) mg/dL    POC Protein Negative Negative mg/dL    POC Urine PH 6.5 5.0 - 8.0    POC Blood Negative Negative    POC Specific Gravity 1.020 <1.005 - >1.030    POC Ketones Negative Negative mg/dL    POC Bilirubin  Negative mg/dL    POC Glucose Negative Negative mg/dL   CHLAMYDIA/GC PCR URINE OR SWAB    Collection Time: 05/21/18 10:24 AM   Result Value Ref Range    Source Genital     C. trachomatis by PCR Negative Negative    N. gonorrhoeae by PCR Negative Negative   PREG CNTR PRENATAL PN    Collection Time: 05/21/18 10:55 AM   Result Value Ref Range    Color Yellow     Character Clear     Specific Gravity 1.024 <1.035    Ph 6.0 5.0 - 8.0    Glucose Negative Negative mg/dL    Ketones Negative Negative mg/dL    Protein Negative Negative mg/dL    Bilirubin Negative Negative    Urobilinogen, Urine 0.2 Negative    Nitrite Negative Negative    Leukocyte Esterase Trace (A) Negative    Occult Blood Negative Negative    Micro Urine Req Microscopic     WBC 12.1 (H) 4.8 - 10.8 K/uL    RBC 4.93 4.20 - 5.40 M/uL    Hemoglobin 12.0 12.0 - 16.0 g/dL    Hematocrit 38.5 37.0 - 47.0 %    MCV 78.1 (L) 81.4 - 97.8 fL    MCH 24.3 (L) 27.0 - 33.0 pg    MCHC 31.2 (L) 33.6 - 35.0 g/dL    RDW 54.2 (H) 35.9 - 50.0 fL    Platelet Count 302 164 - 446 K/uL    MPV 9.6 9.0 - 12.9 fL    Neutrophils-Polys 70.90 44.00 - 72.00 %    Lymphocytes 18.90 (L) 22.00 - 41.00 %    Monocytes 9.20 0.00 - 13.40 %    Eosinophils 0.50 0.00 - 6.90 %    Basophils 0.20 0.00 - 1.80 %    Immature Granulocytes 0.30 0.00 - 0.90 %    Nucleated RBC 0.00 /100 WBC    Neutrophils (Absolute) 8.55 (H) 2.00 - 7.15 K/uL    Lymphs (Absolute) 2.28 1.00 - 4.80 K/uL    Monos (Absolute) 1.11 (H) 0.00 - 0.85 K/uL    Eos (Absolute) 0.06 0.00 - 0.51 K/uL    Baso (Absolute) 0.02 0.00 - 0.12 K/uL    Immature Granulocytes (abs) 0.04 0.00 - 0.11 K/uL    NRBC (Absolute) 0.00 K/uL    Rubella IgG Antibody 8.20 IU/mL    Syphilis,  Treponemal Qual Non Reactive Non Reactive    Hepatitis B Surface Antigen Negative Negative   HIV AG/AB COMBO ASSAY SCREENING    Collection Time: 05/21/18 10:55 AM   Result Value Ref Range    HIV Ag/Ab Combo Assay Non Reactive Non Reactive   OP PRENATAL PANEL-BLOOD BANK    Collection Time: 05/21/18 10:55 AM   Result Value Ref Range    ABO Grouping Only O     Rh Grouping Only POS     Antibody Screen Scrn NEG    URINE MICROSCOPIC (W/UA)    Collection Time: 05/21/18 10:55 AM   Result Value Ref Range    WBC 5-10 (A) /hpf    RBC 0-2 /hpf    Bacteria Negative None /hpf    Epithelial Cells Few /hpf    Ca Oxalate Crystal Few /hpf    Hyaline Cast 3-5 (A) /lpf   AFP TETRA    Collection Time: 06/19/18  3:59 PM   Result Value Ref Range    AFP Value -Eia 20 ng/mL    AFP MOM Value 0.62     Ue3 Value 1.07 ng/mL    Ue3 Mom 1.11     Patient's hCG, 2nd Trimester 10552 IU/L    hCG MoM, 2nd Trimester 1.03     Felisha Value -Eia 215 pg/mL    Felisha Mom Value 1.36     Interpretation Screen Neg     M[age]IE 18.7 yr    Maternal Weight 167.0 lbs.     Gest. Age on Collection Date 16 wks, 3 days     Gestational Age Based On Other     Multiple Pregnancy Argueta     Race Nonblack     Insulin Dependent Diabetes No     Smoking No     Family Hx NTD No     Family Hx of Aneuploidy No     Specimen See Note     EER Quad, Maternal Serum See Note    GLUCOSE 1HR GESTATIONAL    Collection Time: 08/29/18  9:51 AM   Result Value Ref Range    Glucose, Post Dose 115 70 - 139 mg/dL   HCT    Collection Time: 08/29/18  9:51 AM   Result Value Ref Range    Hematocrit 36.1 (L) 37.0 - 47.0 %   HGB    Collection Time: 08/29/18  9:51 AM   Result Value Ref Range    Hemoglobin 11.4 (L) 12.0 - 16.0 g/dL   T.PALLIDUM AB EIA    Collection Time: 08/29/18  9:51 AM   Result Value Ref Range    Syphilis, Treponemal Qual Non Reactive Non Reactive   HGB    Collection Time: 10/08/18  3:20 PM   Result Value Ref Range    Hemoglobin 10.2 (L) 12.0 - 16.0 g/dL   HCT    Collection  "Time: 10/08/18  3:20 PM   Result Value Ref Range    Hematocrit 33.6 (L) 37.0 - 47.0 %   GRP B STREP, BY PCR (WALLACE BROTH)    Collection Time: 10/31/18 11:50 AM   Result Value Ref Range    Strep Gp B DNA PCR Negative Negative   Ferning if suspected rupture of membranes (ROM)    Collection Time: 18 12:55 PM   Result Value Ref Range    Fern Test On Amniotic Fluid see below Not present        Assessment:   Peggy Pollard at 39w1d  Labor status: Not in labor.  Fetal status reassuring.   Fern + consistent with PPROM.  Severely elevated BPs.  Hx of methamphetamine use, reports last use \"1-2 weeks ago\", states she may be + for meth due to \"contact high\".      Obstetrical history significant for   Patient Active Problem List    Diagnosis Date Noted   • Anemia in pregnancy, third trimester 10/10/2018   • Abnormal pregnancy US 2018   • Circumvallate placenta 2018   • Rubella non-immune status, antepartum 2018   • Supervision of other normal pregnancy, antepartum 2018   • History of marijuana use 2018   • History of methamphetamine use 2018   .      Plan:     Admit to L&D  GBS negative  Start Magnesium Sulfate 4 gram load, then two grams per hour for seizure prophylaxis.  Hypertensive protocol - start with Hydralazine due to presumed recent meth use.  Urine drug screen  PIH labs    If BPs not controlled with hydralazine, will proceed with primary  section.    Discussed with the patient indications for  delivery. The patient voiced understanding of indications for  section at this time.    Discussed with the patient the risks of  delivery. The risks include bleeding, infection, transfusion, emergency hysterectomy to control bleeding, damage to surrounding organs (bowel, bladder, ureters, nerves, vessels), need for repair or future surgery, fetal injury, unexpected pathology, anesthesia risks, and rarely death.  I also discussed with the " patient the risk of wound infection, wound breakdown, and scarring. We discussed that these risks are greater in people that have diabetes or obesity.    The patient had the opportunity to ask questions regarding the procedure. All questions answered to the patient's satisfaction.

## 2018-11-26 LAB
HCT VFR BLD AUTO: 30.2 % (ref 37–47)
HGB BLD-MCNC: 9.6 G/DL (ref 12–16)
MAGNESIUM SERPL-MCNC: 6 MG/DL (ref 1.5–2.5)
MAGNESIUM SERPL-MCNC: 6.7 MG/DL (ref 1.5–2.5)
MAGNESIUM SERPL-MCNC: 7 MG/DL (ref 1.5–2.5)
MCH RBC QN AUTO: 26.3 PG (ref 27–33)
MCHC RBC AUTO-ENTMCNC: 31.8 G/DL (ref 33.6–35)
MCV RBC AUTO: 82.7 FL (ref 81.4–97.8)
MORPHOLOGY BLD-IMP: NORMAL
PLATELET # BLD AUTO: 142 K/UL (ref 164–446)
PMV BLD AUTO: 9.5 FL (ref 9–12.9)
RBC # BLD AUTO: 3.65 M/UL (ref 4.2–5.4)
WBC # BLD AUTO: 16 K/UL (ref 4.8–10.8)

## 2018-11-26 PROCEDURE — 303615 HCHG EPIDURAL/SPINAL ANESTHESIA FOR LABOR

## 2018-11-26 PROCEDURE — 700111 HCHG RX REV CODE 636 W/ 250 OVERRIDE (IP): Performed by: OBSTETRICS & GYNECOLOGY

## 2018-11-26 PROCEDURE — 700102 HCHG RX REV CODE 250 W/ 637 OVERRIDE(OP): Performed by: OBSTETRICS & GYNECOLOGY

## 2018-11-26 PROCEDURE — 770002 HCHG ROOM/CARE - OB PRIVATE (112)

## 2018-11-26 PROCEDURE — 700105 HCHG RX REV CODE 258: Performed by: OBSTETRICS & GYNECOLOGY

## 2018-11-26 PROCEDURE — 85027 COMPLETE CBC AUTOMATED: CPT

## 2018-11-26 PROCEDURE — 83735 ASSAY OF MAGNESIUM: CPT

## 2018-11-26 PROCEDURE — A9270 NON-COVERED ITEM OR SERVICE: HCPCS | Performed by: OBSTETRICS & GYNECOLOGY

## 2018-11-26 PROCEDURE — 36415 COLL VENOUS BLD VENIPUNCTURE: CPT

## 2018-11-26 PROCEDURE — 700102 HCHG RX REV CODE 250 W/ 637 OVERRIDE(OP): Performed by: ANESTHESIOLOGY

## 2018-11-26 PROCEDURE — A9270 NON-COVERED ITEM OR SERVICE: HCPCS | Performed by: ANESTHESIOLOGY

## 2018-11-26 PROCEDURE — 700111 HCHG RX REV CODE 636 W/ 250 OVERRIDE (IP): Performed by: ANESTHESIOLOGY

## 2018-11-26 PROCEDURE — 59514 CESAREAN DELIVERY ONLY: CPT

## 2018-11-26 RX ORDER — DOCUSATE SODIUM 100 MG/1
100 CAPSULE, LIQUID FILLED ORAL 2 TIMES DAILY PRN
Status: DISCONTINUED | OUTPATIENT
Start: 2018-11-26 | End: 2018-11-30 | Stop reason: HOSPADM

## 2018-11-26 RX ORDER — VITAMIN A ACETATE, BETA CAROTENE, ASCORBIC ACID, CHOLECALCIFEROL, .ALPHA.-TOCOPHEROL ACETATE, DL-, THIAMINE MONONITRATE, RIBOFLAVIN, NIACINAMIDE, PYRIDOXINE HYDROCHLORIDE, FOLIC ACID, CYANOCOBALAMIN, CALCIUM CARBONATE, FERROUS FUMARATE, ZINC OXIDE, CUPRIC OXIDE 3080; 12; 120; 400; 1; 1.84; 3; 20; 22; 920; 25; 200; 27; 10; 2 [IU]/1; UG/1; MG/1; [IU]/1; MG/1; MG/1; MG/1; MG/1; MG/1; [IU]/1; MG/1; MG/1; MG/1; MG/1; MG/1
1 TABLET, FILM COATED ORAL EVERY MORNING
Status: DISCONTINUED | OUTPATIENT
Start: 2018-11-26 | End: 2018-11-30 | Stop reason: HOSPADM

## 2018-11-26 RX ORDER — DIPHENHYDRAMINE HYDROCHLORIDE 50 MG/ML
25 INJECTION INTRAMUSCULAR; INTRAVENOUS EVERY 6 HOURS PRN
Status: DISCONTINUED | OUTPATIENT
Start: 2018-11-26 | End: 2018-11-30 | Stop reason: HOSPADM

## 2018-11-26 RX ORDER — SODIUM CHLORIDE, SODIUM LACTATE, POTASSIUM CHLORIDE, AND CALCIUM CHLORIDE .6; .31; .03; .02 G/100ML; G/100ML; G/100ML; G/100ML
500 INJECTION, SOLUTION INTRAVENOUS ONCE
Status: DISCONTINUED | OUTPATIENT
Start: 2018-11-26 | End: 2018-11-26

## 2018-11-26 RX ORDER — KETOROLAC TROMETHAMINE 30 MG/ML
30 INJECTION, SOLUTION INTRAMUSCULAR; INTRAVENOUS EVERY 6 HOURS
Status: DISCONTINUED | OUTPATIENT
Start: 2018-11-27 | End: 2018-11-27

## 2018-11-26 RX ORDER — IBUPROFEN 800 MG/1
800 TABLET ORAL EVERY 8 HOURS PRN
Status: DISCONTINUED | OUTPATIENT
Start: 2018-11-26 | End: 2018-11-30 | Stop reason: HOSPADM

## 2018-11-26 RX ORDER — ONDANSETRON 2 MG/ML
4 INJECTION INTRAMUSCULAR; INTRAVENOUS EVERY 6 HOURS PRN
Status: DISCONTINUED | OUTPATIENT
Start: 2018-11-26 | End: 2018-11-30 | Stop reason: HOSPADM

## 2018-11-26 RX ORDER — HALOPERIDOL 5 MG/ML
1 INJECTION INTRAMUSCULAR
Status: DISCONTINUED | OUTPATIENT
Start: 2018-11-26 | End: 2018-11-26

## 2018-11-26 RX ORDER — MORPHINE SULFATE 10 MG/ML
4 INJECTION, SOLUTION INTRAMUSCULAR; INTRAVENOUS
Status: DISCONTINUED | OUTPATIENT
Start: 2018-11-26 | End: 2018-11-30 | Stop reason: HOSPADM

## 2018-11-26 RX ORDER — SODIUM CHLORIDE, SODIUM LACTATE, POTASSIUM CHLORIDE, CALCIUM CHLORIDE 600; 310; 30; 20 MG/100ML; MG/100ML; MG/100ML; MG/100ML
INJECTION, SOLUTION INTRAVENOUS PRN
Status: DISCONTINUED | OUTPATIENT
Start: 2018-11-26 | End: 2018-11-30 | Stop reason: HOSPADM

## 2018-11-26 RX ORDER — ACETAMINOPHEN 325 MG/1
325 TABLET ORAL EVERY 4 HOURS PRN
Status: DISCONTINUED | OUTPATIENT
Start: 2018-11-26 | End: 2018-11-30 | Stop reason: HOSPADM

## 2018-11-26 RX ORDER — ONDANSETRON 4 MG/1
4 TABLET, ORALLY DISINTEGRATING ORAL EVERY 6 HOURS PRN
Status: DISCONTINUED | OUTPATIENT
Start: 2018-11-26 | End: 2018-11-30 | Stop reason: HOSPADM

## 2018-11-26 RX ORDER — SIMETHICONE 80 MG
80 TABLET,CHEWABLE ORAL 4 TIMES DAILY PRN
Status: DISCONTINUED | OUTPATIENT
Start: 2018-11-26 | End: 2018-11-30 | Stop reason: HOSPADM

## 2018-11-26 RX ORDER — OXYCODONE HYDROCHLORIDE 5 MG/1
5 TABLET ORAL EVERY 4 HOURS PRN
Status: DISCONTINUED | OUTPATIENT
Start: 2018-11-26 | End: 2018-11-26

## 2018-11-26 RX ORDER — OXYCODONE HYDROCHLORIDE 10 MG/1
10 TABLET ORAL EVERY 4 HOURS PRN
Status: DISCONTINUED | OUTPATIENT
Start: 2018-11-26 | End: 2018-11-26

## 2018-11-26 RX ORDER — ONDANSETRON 2 MG/ML
4 INJECTION INTRAMUSCULAR; INTRAVENOUS
Status: DISCONTINUED | OUTPATIENT
Start: 2018-11-26 | End: 2018-11-26

## 2018-11-26 RX ORDER — DIPHENHYDRAMINE HCL 25 MG
25 TABLET ORAL EVERY 6 HOURS PRN
Status: DISCONTINUED | OUTPATIENT
Start: 2018-11-26 | End: 2018-11-30 | Stop reason: HOSPADM

## 2018-11-26 RX ORDER — MEPERIDINE HYDROCHLORIDE 25 MG/ML
6.25 INJECTION INTRAMUSCULAR; INTRAVENOUS; SUBCUTANEOUS
Status: DISCONTINUED | OUTPATIENT
Start: 2018-11-26 | End: 2018-11-26

## 2018-11-26 RX ORDER — BISACODYL 10 MG
10 SUPPOSITORY, RECTAL RECTAL PRN
Status: DISCONTINUED | OUTPATIENT
Start: 2018-11-26 | End: 2018-11-30 | Stop reason: HOSPADM

## 2018-11-26 RX ORDER — DIPHENHYDRAMINE HYDROCHLORIDE 50 MG/ML
12.5 INJECTION INTRAMUSCULAR; INTRAVENOUS
Status: DISCONTINUED | OUTPATIENT
Start: 2018-11-26 | End: 2018-11-26

## 2018-11-26 RX ORDER — OXYCODONE HYDROCHLORIDE 5 MG/1
5 TABLET ORAL EVERY 4 HOURS PRN
Status: DISCONTINUED | OUTPATIENT
Start: 2018-11-26 | End: 2018-11-30 | Stop reason: HOSPADM

## 2018-11-26 RX ORDER — OXYCODONE HYDROCHLORIDE 10 MG/1
10 TABLET ORAL EVERY 4 HOURS PRN
Status: DISCONTINUED | OUTPATIENT
Start: 2018-11-26 | End: 2018-11-30 | Stop reason: HOSPADM

## 2018-11-26 RX ADMIN — KETOROLAC TROMETHAMINE 30 MG: 30 INJECTION, SOLUTION INTRAMUSCULAR at 19:21

## 2018-11-26 RX ADMIN — KETOROLAC TROMETHAMINE 30 MG: 30 INJECTION, SOLUTION INTRAMUSCULAR at 13:11

## 2018-11-26 RX ADMIN — OXYCODONE HYDROCHLORIDE 10 MG: 10 TABLET ORAL at 06:50

## 2018-11-26 RX ADMIN — ONDANSETRON 4 MG: 2 INJECTION INTRAMUSCULAR; INTRAVENOUS at 06:45

## 2018-11-26 RX ADMIN — OXYCODONE HYDROCHLORIDE 10 MG: 10 TABLET ORAL at 18:09

## 2018-11-26 RX ADMIN — OXYCODONE HYDROCHLORIDE 10 MG: 10 TABLET ORAL at 14:08

## 2018-11-26 RX ADMIN — KETOROLAC TROMETHAMINE 30 MG: 30 INJECTION, SOLUTION INTRAMUSCULAR at 06:35

## 2018-11-26 RX ADMIN — Medication 1 TABLET: at 06:35

## 2018-11-26 RX ADMIN — SODIUM CHLORIDE, POTASSIUM CHLORIDE, SODIUM LACTATE AND CALCIUM CHLORIDE: 600; 310; 30; 20 INJECTION, SOLUTION INTRAVENOUS at 08:15

## 2018-11-26 RX ADMIN — MAGNESIUM SULFATE IN WATER 2 G/HR: 40 INJECTION, SOLUTION INTRAVENOUS at 08:15

## 2018-11-26 RX ADMIN — ACETAMINOPHEN 1000 MG: 500 TABLET, FILM COATED ORAL at 03:02

## 2018-11-26 RX ADMIN — ACETAMINOPHEN 1000 MG: 500 TABLET, FILM COATED ORAL at 15:50

## 2018-11-26 RX ADMIN — OXYCODONE HYDROCHLORIDE 10 MG: 10 TABLET ORAL at 03:03

## 2018-11-26 RX ADMIN — ACETAMINOPHEN 1000 MG: 500 TABLET, FILM COATED ORAL at 09:35

## 2018-11-26 ASSESSMENT — PAIN SCALES - GENERAL
PAINLEVEL_OUTOF10: 8
PAINLEVEL_OUTOF10: 5
PAINLEVEL_OUTOF10: 4
PAINLEVEL_OUTOF10: 5
PAINLEVEL_OUTOF10: 8
PAINLEVEL_OUTOF10: 9
PAINLEVEL_OUTOF10: 6
PAINLEVEL_OUTOF10: 2
PAINLEVEL_OUTOF10: 6
PAINLEVEL_OUTOF10: 8
PAINLEVEL_OUTOF10: 7

## 2018-11-26 NOTE — PROGRESS NOTES
Post Partum Progress Note    Name:   Peggy Pollard   Date/Time:  2018 - 6:00 AM  Chief Admitting Dx:  Pregnancy  Indication for care in labor or delivery  Delivery Type:   for gestational hypertension  Post-Op/Post Partum Days #:  1    Subjective:  Abdominal pain: no  Ambulating:   no  Tolerating liquids:  yes  Tolerating food:  yes common adult  Flatus:   no  BM:    no  Bleeding:   without any bleeding  Voiding:   Kidd to gravity  Dizziness:   no  Feeding:   breast    Vitals:    18 0200 18 0300 18 0400 18 0500   BP: 133/98 117/84 136/98 132/90   Pulse: 89 (!) 101 91 87   Resp: 18 18 16 16   Temp: 36.9 °C (98.4 °F)  37.2 °C (98.9 °F)    TempSrc: Temporal  Temporal    SpO2: 95% 97% 97% 92%   Weight:       Height:           Exam:  Breast: Tenderness no  Abdomen: Abdomen soft, non-tender. BS normal. No masses,  No organomegaly  Fundal Tenderness:  yes  Fundus Firm: yes  Incision: dressing in place appears  dry and intact  Below umbilicus: no  Perineum: perineum intact  Lochia: mild  Extremities: Normal extremities, peripheral pulses and reflexes normal    Meds:  Current Facility-Administered Medications   Medication Dose   • ondansetron (ZOFRAN ODT) dispertab 4 mg  4 mg    Or   • ondansetron (ZOFRAN) syringe/vial injection 4 mg  4 mg   • oxytocin (PITOCIN) infusion (for postpartum)  2,000 mL/hr    Followed by   • oxytocin (PITOCIN) infusion (for postpartum)   mL/hr   • acetaminophen (TYLENOL) tablet 325 mg  325 mg   • LR infusion     • PRN oxytocin (PITOCIN) (20 Units/1000 mL) PRN for excessive uterine bleeding - See Admin Instr  125-999 mL/hr   • docusate sodium (COLACE) capsule 100 mg  100 mg   • bisacodyl (DULCOLAX) suppository 10 mg  10 mg   • magnesium hydroxide (MILK OF MAGNESIA) suspension 30 mL  30 mL   • simethicone (MYLICON) chewable tab 80 mg  80 mg   • prenatal plus vitamin (STUARTNATAL 1+1) 27-1 MG tablet 1 Tab  1 Tab   • lactated ringers  infusion     • oxytocin (PITOCIN) infusion (for induction)  0.5-20 susy-units/min   • magnesium sulfate 20 g/500mL infusion  2 g/hr   • acetaminophen (TYLENOL) tablet 1,000 mg  1,000 mg   • ketorolac (TORADOL) injection 30 mg  30 mg   • oxyCODONE immediate-release (ROXICODONE) tablet 5 mg  5 mg   • oxyCODONE immediate release (ROXICODONE) tablet 10 mg  10 mg   • HYDROmorphone pf (DILAUDID) injection 0.2 mg  0.2 mg   • HYDROmorphone pf (DILAUDID) injection 0.4 mg  0.4 mg   • diphenhydrAMINE (BENADRYL) injection 12.5 mg  12.5 mg    Or   • diphenhydrAMINE (BENADRYL) injection 25 mg  25 mg    Or   • naloxone (NARCAN) 0.4 mg in NS 1,000 mL infusion  0.4 mg       Labs:   Recent Labs      18   1318   WBC  13.8*   RBC  4.41   HEMOGLOBIN  11.8*   HEMATOCRIT  35.5*   MCV  80.5*   MCH  26.8*   MCHC  33.2*   PLATELETCT  169   MPV  9.9       Assessment:  Chief Admitting Dx:  Pregnancy  Indication for care in labor or delivery  Delivery Type:   for gestational hypertension   Tubal Ligation:  no    Plan:  Continue routine post partum care.  Advance care encourage breastfeeding  Magnesium sulfate for 1st 24 hrs pp      CARMELITA Diaz.

## 2018-11-26 NOTE — DISCHARGE PLANNING
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  Address: [address] [place] [address] [place] [address]  Phone: [phone]  Type of Living Situation: living with sister  Mom Diagnosis: Pregnancy  Baby Diagnosis:   Primary Language: English     Name of Baby: Maxine Hernandez (: 18)  Father of the Baby: Gera (did not want to provide his last name)  Involved in baby’s care? Unsure.  States he hasn't been involved during the pregnancy and is not sure if he is going to be involved with infant.  Contact Information: Did not provide  Does not live with the FOB     Prenatal Care: Yes  Mom's PCP: Wanda Houston  PCP for new baby: Pediatrician list provided     Support System: MOB's sister and family   Coping/Bonding between mother & baby: Yes  Source of Feeding: breast  Supplies for Infant: car seat, clothes, diapers, and blankets.  Provided MOB with a bag of  supplies.     Mom's Insurance: Medicaid  Baby Covered on Insurance:Yes  Mother Employed/School: Not currently   Other children in the home/names & ages: 1st baby     Financial Hardship/Income: Has applied for housing and food stamps   Mom's Mental status: alert and oriented  Services used prior to admit: Medicaid and WIC     CPS History: denies  Psychiatric History: denies  Domestic Violence History: denies  Drug/ETOH History: History of THC and meth.  Admits to using a couple of weeks ago.  States she has used \"a few\" times during pregnancy and contributes it to the people she is hanging around.  Notified MOB that her and baby's UDS are both negative.  [facility] positive [facility]       Resources Provided: Pediatrician list, children and family resource list, counseling resource for post partum depression, bag of  supplies  Referrals Made: diaper bank referral      Clearance for Discharge: Infant is cleared to discharge home with MOB.     Ongoing Plan: Met with " MOB who had a  yesterday.  She admits to using meth and THC a couple of weeks ago and states it's because of the people she is hanging around with.  MOB and infant's UDS are both negative.  Discussed the importance of staying clean and sober now that Maxine is here and the risk of having CPS involved if she does not.  Asked MOB if she needed resources for substance abuse and MOB declines.  She stated she just needs to limit the stress in her life and who she is hanging around with.  MOB states she has good support from her family and is so happy that her baby is born and healthy.  Provided MOB with resources.  Cleared for discharge per .

## 2018-11-26 NOTE — OR SURGEON
Immediate Post OP Note    PreOp Diagnosis: IUP @ 39+1/7 wks with severe pre-eclampsia    PostOp Diagnosis: Same    Procedure(s):  PRIMARY C SECTION - Wound Class: Clean Contaminated    Surgeon(s):  Rekha aMnuel M.D.    Anesthesiologist/Type of Anesthesia:  Anesthesiologist: Jackie Clayton M.D./Spinal    Surgical Staff:  Circulator: Andreia Monsalve RDMEETRIS  Relief Circulator: Mae Hernández RDEMETRIS  Scrub Person: Hanny Quiroz  First Assist: Rosario Britton R.N.  L&D Baby  Nurse: Tristen Aponte R.N.    Specimens removed if any:  * No specimens in log *    Estimated Blood Loss: 1000 cc    Findings: viable female infant in cephalic presentation with apgars 7 and 8, clear fluid.  Normal uterus, tubes, ovaries.    Complications: None.        11/25/2018 8:27 PM Rekha Manuel M.D.

## 2018-11-26 NOTE — PROGRESS NOTES
S: Pt comfortable with epidural.    O:  Vitals:    18 1858   BP:    Pulse: (!) 108   Temp:    SpO2:      BPs 160s/100s.     [place] - irregular contractions  EFM - 130s, moderate variability, + accels  Cx - 1cm/60%/-2    Pitocin at 9    A/P: 19 yo  @ 39+1/7 wks admitted for PROM and elevated BPS with proteinuria consistent with pre-eclampsia.  Pt received 2 doses of IV Hydralazine and BPs normalized, now returning to severe levels.  Given escalation of BPs and unfavorable cervix despite pitocin, will proceed with primary C/S.  Plan to continue Magnesium Sulfate 2 grams/hour for 24 hours post delivery.    Discussed with the patient indications for  delivery. The patient voiced understanding of indications for  section at this time.    Discussed with the patient the risks of  delivery. The risks include bleeding, infection, transfusion, emergency hysterectomy to control bleeding, damage to surrounding organs (bowel, bladder, ureters, nerves, vessels), need for repair or future surgery, fetal injury, unexpected pathology, anesthesia risks, and rarely death.  I also discussed with the patient the risk of wound infection, wound breakdown, and scarring. We discussed that these risks are greater in people that have diabetes or obesity.    The patient had the opportunity to ask questions regarding the procedure. All questions answered to the patient's satisfaction. Plan to proceed with  delivery.

## 2018-11-26 NOTE — PROGRESS NOTES
1320 Report received from ARIA Lino RN. Pt transferred to [facility] via w/c with all personal belongings and chart.     1345 Second dose of Hydralazine IV PRN given for BP of 164/107. Dr Manuel updated on pt status. Magnesium bolus currently infusing.     1355 /82.     1430 Dr Clayton at bedside for epidural placement.     1445 Dr Clayton unable to place epidural at this time. MD left bedside to OR for crash C/S for another pt.     1604 Epidural placed by Dr Clayton.     1645 Dr Monroe, OB Resident, at bedside. Updated on pt status including tachycardia. SVE 1/60/-2. No new orders at this time.     1705 BP of 171/104, pt lying on arm with BP cuff. Pt repositioned to R side. /78. Dr Manuel updated on elevated BP while on L side. No new orders at this time.     1900 Report given to Andreia Kwok RN.

## 2018-11-26 NOTE — PROGRESS NOTES
Leana from Lab called with critical result of Magnesium = 6.7 at 1150. Critical lab result read back to Leana.   This critical lab result is within parameters established by Dr. Manuel for this patient

## 2018-11-26 NOTE — PROGRESS NOTES
1900- Report received from MINH Fishman RN. POC discussed. SVE 1-2/60%/-2. Pressures elevated 160s/100s    1905- Update to Dr. Manuel. C/S called.     1939- Pt transferred to OR 2 for primary C/S for failure to progress and severe pre-eclampsia, by labor bed, without incident.     1958- C/S delivery of viable female infant. Apgars 7/8.    2031- Pt transferred by rGeff from OR2 to PACU3 for recovery, without incident.     2359- Pt and infant transferred to PPU via rney without incident. Report given to ASHLEY Hernandez RN. POC discussed. Bands and cuddles verified.

## 2018-11-26 NOTE — CARE PLAN
Problem: Altered physiologic condition related to postoperative  delivery  Goal: Patient physiologically stable as evidenced by normal lochia, palpable uterine involution and vital signs within normal limits  Outcome: PROGRESSING AS EXPECTED  Pt states pain is being adequately controlled, able to ambulate and care for self and infant. Encouraged pt to call for PRN pain medications as needed. Pain assessed q2-4 hours.     Problem: Potential for postpartum infection related to surgical incision, compromised uterine condition, urinary tract or respiratory compromise  Goal: Patient will be afebrile and free from signs and symptoms of infection  Outcome: PROGRESSING AS EXPECTED  Pt afebrile, incision dressing clean/dry/intact. Education provided regarding incision care. No signs of infection at this time.

## 2018-11-26 NOTE — LACTATION NOTE
Mom expresses no concerns about breastfeeding.  C/S, on Mg, mom alone. Reports 18 nieces and nephews, 13 total siblings, one boy.    Just latched for 7 minutes on the left side sustained sucking prior to breastfeeding evaluation.    To right breast, adjusted moms V hold to a C hold, deep asymmetrical latch and sustained sucking. Reviewed technique. Written breastfeeding resources provided.    Plan:  Offer breast every 2-3 hours  Supplement and pumping not needed at this time.

## 2018-11-26 NOTE — PROGRESS NOTES
Jose from Lab called with critical result of Magnesium at 5.3. Critical lab result read back to Jose.   This critical lab result is within parameters established by Dr. Manuel for this patient

## 2018-11-26 NOTE — OP REPORT
DATE OF SERVICE:  2018    PREOPERATIVE DIAGNOSES:  1.  Intrauterine pregnancy at 39-1/7th weeks estimated gestational age.  2.  Premature rupture of membranes.  3.  Severe preeclampsia.    PREOPERATIVE DIAGNOSES:  1.  Intrauterine pregnancy at 39-1/7th weeks estimated gestational age.  2.  Premature rupture of membranes.  3.  Severe preeclampsia.    PROCEDURE PERFORMED:  Primary low transverse  section via   Pfannenstiel.    SURGEON:  Rekha Manuel MD    FIRST ASSISTANT:  Rosario Britton, certified nurse midwife.    ANESTHESIA:  Epidural.    COMPLICATIONS:  None.    ESTIMATED BLOOD LOSS:  1000 mL    FLUIDS:  1000 mL of Plasmalyte.    URINE OUTPUT:  50 mL of clear urine.    INDICATIONS FOR PROCEDURE:  This is a 18-year-old  2, para 0-0-1-0 at   39-1/7th weeks estimated gestational age who presented to labor and delivery   with complaints of leaking of fluid for 1 week.  She was found to have a   positive fern test and pooling consistent with rupture of membranes.  Upon   admission, she was noted to have blood pressures in the 160s to 170s over 100   to 110's.  She was started on magnesium sulfate for seizure prophylaxis   and 2 doses of IV hydralazine.  Her blood pressures normalized   after 2 doses of hydralazine and the decision was made to proceed with   induction of labor.  The patient received Pitocin for approximately 6 hours   with no change in the cervical exam at 1/50%/-2, blood pressures began to   escalate into the 160s/100s and the decision was made to proceed with primary    section for severe preeclampsia, remote from delivery.    FINDINGS:  Viable female infant in cephalic presentation with Apgars of 7 and   8, clear fluid, weighing 4010 g.  Normal uterus, tubes, and ovaries.    PROCEDURE IN DETAIL:  Patient was taken to the operating room where epidural   anesthesia was found to be adequate.  She was then prepped and draped in the   normal sterile fashion in the dorsal supine  position with a leftward hip tilt.    A Pfannenstiel skin incision was then made with a scalpel and carried down   to the layer of the underlying fascia, which was nicked in the midline and the   incision was extended laterally with the Zee scissors.  The superior aspect   of the fascial incision was then grasped with Kocher clamps, elevated and the   underlying rectus muscles dissected off sharply.  Attention was then turned to   the inferior aspect of this incision, which in a similar fashion was grasped,   tented up with Kocher clamps and the rectus muscles dissected off sharply.    The rectus muscles were  in the midline and the peritoneum was   entered bluntly and stretched.  Bladder blade was inserted and the   vesicouterine peritoneum was identified, grasped with pickups and entered   sharply with the Metzenbaum scissors.  This incision was extended laterally   and the bladder flap was created digitally.  The bladder blade was then   reinserted and the lower uterine segment incised in a transverse fashion with   a scalpel.  The uterine incision was extended laterally digitally.  The   bladder blade was removed and the infant's head was delivered atraumatically.    The mouth and nose were suctioned with bulb suction on the field.  Remainder   of the infant was delivered without difficulty and cord was clamped and cut.    The infant was handed to the awaiting attendant.  The placenta was then   removed manually.  The uterus was exteriorized and cleared of all clot and   debris.  The uterine incision was repaired with 1-0 chromic in a running   locked fashion.  A second layer of the same suture was used to obtain   excellent hemostasis.  The uterus was returned to the abdomen.  The gutters   were cleared of all clot and debris.  The peritoneum was closed with 3-0   Vicryl, the fascia was reapproximated with 0 Vicryl in a running fashion.  The   subcuticular fat was irrigated.  Arlene's fascia was  closed with interrupted   sutures of 2-0 Vicryl and the skin was closed with staples.  The patient   tolerated the procedure well.  Sponge, lap and needle counts were correct x3.    The patient was taken to the recovery room in stable condition.       ____________________________________     Rekha Manuel MD [facility] / MART[facility]N    DD:  11/25/2018 20:54:18  DT:  11/25/2018 21:19:11    D#:  [id number]  Job#:  [id number]

## 2018-11-26 NOTE — PROGRESS NOTES
0000: Pt arrived from L&D via gurney with infant in arms, bedside report received from CHRIS Boswell. No family at bedside, FOB not involved per MOB. Armbands verified with second RN, transponder number verified with lights flashing. Pt assessed, fundus firm, lochia light, VSS. Second bag of Pitocin infusing at 75 mL/hr, Magnesium Sulfate infusing at 2 g (50 mL)/hr. Pt has horn catheter in place, will continue hourly I&Os. Oriented to room, call light, emergency light, Skylight. Educated pt on feeding infant Q 2-3 hours, recording I&Os, falls education, and safe sleeping. POC discussed, questions answered, call light within reach.

## 2018-11-27 PROCEDURE — 700102 HCHG RX REV CODE 250 W/ 637 OVERRIDE(OP): Performed by: OBSTETRICS & GYNECOLOGY

## 2018-11-27 PROCEDURE — 700112 HCHG RX REV CODE 229: Performed by: OBSTETRICS & GYNECOLOGY

## 2018-11-27 PROCEDURE — A9270 NON-COVERED ITEM OR SERVICE: HCPCS | Performed by: OBSTETRICS & GYNECOLOGY

## 2018-11-27 PROCEDURE — 770002 HCHG ROOM/CARE - OB PRIVATE (112)

## 2018-11-27 PROCEDURE — 700111 HCHG RX REV CODE 636 W/ 250 OVERRIDE (IP): Performed by: OBSTETRICS & GYNECOLOGY

## 2018-11-27 RX ORDER — ECHINACEA PURPUREA EXTRACT 125 MG
2 TABLET ORAL
Status: DISCONTINUED | OUTPATIENT
Start: 2018-11-27 | End: 2018-11-30 | Stop reason: HOSPADM

## 2018-11-27 RX ADMIN — GUAIFENESIN 200 MG: 100 SOLUTION ORAL at 19:29

## 2018-11-27 RX ADMIN — IBUPROFEN 800 MG: 800 TABLET, FILM COATED ORAL at 14:03

## 2018-11-27 RX ADMIN — Medication 1 TABLET: at 05:19

## 2018-11-27 RX ADMIN — SALINE NASAL SPRAY 2 SPRAY: 1.5 SOLUTION NASAL at 08:53

## 2018-11-27 RX ADMIN — GUAIFENESIN 200 MG: 100 SOLUTION ORAL at 08:53

## 2018-11-27 RX ADMIN — OXYCODONE HYDROCHLORIDE 10 MG: 10 TABLET ORAL at 14:03

## 2018-11-27 RX ADMIN — OXYCODONE HYDROCHLORIDE 10 MG: 10 TABLET ORAL at 00:11

## 2018-11-27 RX ADMIN — KETOROLAC TROMETHAMINE 30 MG: 30 INJECTION, SOLUTION INTRAMUSCULAR at 00:03

## 2018-11-27 RX ADMIN — DOCUSATE SODIUM 100 MG: 100 CAPSULE, LIQUID FILLED ORAL at 07:39

## 2018-11-27 RX ADMIN — KETOROLAC TROMETHAMINE 30 MG: 30 INJECTION, SOLUTION INTRAMUSCULAR at 05:19

## 2018-11-27 RX ADMIN — OXYCODONE HYDROCHLORIDE 10 MG: 10 TABLET ORAL at 07:39

## 2018-11-27 RX ADMIN — OXYCODONE HYDROCHLORIDE 10 MG: 10 TABLET ORAL at 19:29

## 2018-11-27 ASSESSMENT — PAIN SCALES - GENERAL
PAINLEVEL_OUTOF10: 1
PAINLEVEL_OUTOF10: 4
PAINLEVEL_OUTOF10: 4
PAINLEVEL_OUTOF10: 8
PAINLEVEL_OUTOF10: 4
PAINLEVEL_OUTOF10: 10
PAINLEVEL_OUTOF10: 9
PAINLEVEL_OUTOF10: 1
PAINLEVEL_OUTOF10: 9
PAINLEVEL_OUTOF10: 2

## 2018-11-27 ASSESSMENT — PATIENT HEALTH QUESTIONNAIRE - PHQ9
1. LITTLE INTEREST OR PLEASURE IN DOING THINGS: NOT AT ALL
2. FEELING DOWN, DEPRESSED, IRRITABLE, OR HOPELESS: NOT AT ALL
SUM OF ALL RESPONSES TO PHQ9 QUESTIONS 1 AND 2: 0

## 2018-11-27 ASSESSMENT — EDINBURGH POSTNATAL DEPRESSION SCALE (EPDS)
I HAVE BEEN SO UNHAPPY THAT I HAVE BEEN CRYING: NO, NEVER
THINGS HAVE BEEN GETTING ON TOP OF ME: NO, I HAVE BEEN COPING AS WELL AS EVER
I HAVE BLAMED MYSELF UNNECESSARILY WHEN THINGS WENT WRONG: NOT VERY OFTEN
I HAVE BEEN ABLE TO LAUGH AND SEE THE FUNNY SIDE OF THINGS: AS MUCH AS I ALWAYS COULD
I HAVE LOOKED FORWARD WITH ENJOYMENT TO THINGS: AS MUCH AS I EVER DID
I HAVE BEEN SO UNHAPPY THAT I HAVE HAD DIFFICULTY SLEEPING: NOT AT ALL
I HAVE BEEN ANXIOUS OR WORRIED FOR NO GOOD REASON: NO, NOT AT ALL
I HAVE FELT SAD OR MISERABLE: NO, NOT AT ALL
THE THOUGHT OF HARMING MYSELF HAS OCCURRED TO ME: NEVER
I HAVE FELT SCARED OR PANICKY FOR NO GOOD REASON: NO, NOT AT ALL

## 2018-11-27 ASSESSMENT — LIFESTYLE VARIABLES: DO YOU DRINK ALCOHOL: NO

## 2018-11-27 NOTE — PROGRESS NOTES
1921- Patient is declining the MMR vaccine at this time.  Patient wants to talk to the MD and get it at a later time.  Discussed the reasons to receive the MMR and patient given the MMR VIS sheet.

## 2018-11-27 NOTE — CARE PLAN
Problem: Communication  Goal: The ability to communicate needs accurately and effectively will improve  Outcome: PROGRESSING AS EXPECTED  Reviewed plan of care with patient who verbalized understanding.    Problem: Altered physiologic condition related to postoperative  delivery  Goal: Patient physiologically stable as evidenced by normal lochia, palpable uterine involution and vital signs within normal limits  Outcome: PROGRESSING AS EXPECTED  Fundus firm.  Lochia light-moderate at beginning of shift.  VSS.    Problem: Potential for postpartum infection related to surgical incision, compromised uterine condition, urinary tract or respiratory compromise  Goal: Patient will be afebrile and free from signs and symptoms of infection  Outcome: PROGRESSING AS EXPECTED  Patient is afebrile.

## 2018-11-27 NOTE — CARE PLAN
Problem: Altered physiologic condition related to postoperative  delivery  Goal: Patient physiologically stable as evidenced by normal lochia, palpable uterine involution and vital signs within normal limits  Outcome: PROGRESSING AS EXPECTED  Fundus firm,lochia scant.    Problem: Potential for postpartum infection related to surgical incision, compromised uterine condition, urinary tract or respiratory compromise  Goal: Patient will be afebrile and free from signs and symptoms of infection  Outcome: PROGRESSING AS EXPECTED  Afebrile,no s/s of infection noted.Abdominal dressing clean,dry and intact,no drainage noted.    Problem: Alteration in comfort related to surgical incision and/or after birth pains  Goal: Patient is able to ambulate, care for self and infant with acceptable pain level  Outcome: PROGRESSING AS EXPECTED  Ambulating well without problems.Denies pain nor discomfort at this time,will medicate as needed.    Problem: Potential knowledge deficit related to lack of understanding of self and  care  Goal: Patient will verbalize understanding of self and infant care  Outcome: PROGRESSING AS EXPECTED  Discharge teachings given and verbalized understanding.Has carseat for infant.

## 2018-11-27 NOTE — PROGRESS NOTES
Assessment done. Vital signs stable. Patient voiding without difficulty, claims to be passing flatus. Fundus firm at umbilicus with light lochia. Clips over low abdominal incision clean, dry, and intact. No redness, swelling, or drainage noted. Skin well approximated. Patient ambulating with steady gait. Patient claims to have good pain relief with p.o meds. Breastfeeding infant on demand. Patient encouraged to call for any needs. Will continue to monitor. Pt claims she will call when medications are needed

## 2018-11-27 NOTE — CARE PLAN
Problem: Altered physiologic condition related to postoperative  delivery  Goal: Patient physiologically stable as evidenced by normal lochia, palpable uterine involution and vital signs within normal limits  Outcome: PROGRESSING AS EXPECTED  FF@U with light lochia    Problem: Urinary Elimination:  Goal: Ability to reestablish a normal urinary elimination pattern will improve  Pt claims to be voiding without difficulty

## 2018-11-27 NOTE — PROGRESS NOTES
0700- Bedside report received from CHRIS Fernando.  Patient denied needs.  Patient denied headache, blurred vision, epigastric pain, and nausea.  0735- Patient assessment done.  IV patent.  Indwelling catheter to gravity.  Sequential stockings on.  Abdominal dressing is clean, dry, and intact.  Patient had emesis of approximately 400 mls.  Per report- patient received a dose of Zofran at 0645 today.  Patient denied headache, blurred vision, and epigastric pain.  0900- Patient had emesis of approximately 400 mls.  Patient reported that she wants to try and sleep at this time.  Patient denied headache, blurred vision, and epigastric pain.  1100- Patient denied headache, blurred vision, epigastric pain, and nausea.  1400- Patient up to bathroom with standby assist.  Elsie-care and catheter care done.  Pads changed.  Patient up to sink for hand hygiene, then ambulated back to bed.  Patient tolerated well.

## 2018-11-27 NOTE — CONSULTS
Met with this primip mother who wants baby's latch checked. When I arrived baby was already latched with wide mouth and nursing rhythmically. Baby was in football hold. Mother has a loose sounding cough. She is concerned about the meds she is taking. She is on Guifenisen, oxycodone, and has benedryl ordered but hasn't gotten any. They are L-3s with  sedation as a side-effect. Will educate mother about these meds and what to look for in baby if there are side effects.    Baby latches with a wide mouth and nurses well. When she detaches the nipple appears rounded. Mother offers no complaints pain. Offered lactation help as needed. She has WIC for outpatient lactation needs.

## 2018-11-27 NOTE — PROGRESS NOTES
1915- Winnie Horton CNM, notified that the patient's current bag of Magnesium has completed, notified of blood pressures this shift.  Per Winnie, may discontinue magnesium orders at this time.  Bedside report given to CHRIS Garcia.

## 2018-11-27 NOTE — PROGRESS NOTES
Obstetrics & Gynecology Post-Delivery Progress Note    Date of Service  2018    18 y.o.  POD#2 s/p , Low Transverse   Delivery date: 18  Breastfeeding: Yes    Events  No events    Subjective  Pain: Yes,  not controlled  Bleeding: lochia minimal  PO's: taking regular diet  Voiding: without difficulty  Ambulating: yes  Feeding: breastfeeding well   Pt c/o cough, congestion, and sore throat since Thanksgiving, no CP/SOB, would like meds to help with symptoms, no f/c  Mild lower leg swelling  No headache or visual changes    Objective  Temp:  [36.2 °C (97.1 °F)-36.8 °C (98.2 °F)] 36.5 °C (97.7 °F)  Pulse:  [] 83  Resp:  [16-18] 18  BP: (118-144)/(73-90) 144/90, most in normal range    Physical Exam  General: well  Chest/Breasts: nipples intact and breasts soft  Fundus: firm and below umbilicus, nontender  Incision: healing well, no significant drainage, no dehiscence, no significant erythema and staples intact  Perineum: deferred  Extremities: 1+ edema BLE, no tenderness    Lab Results   Component Value Date    RBC 3.65 (L) 2018    ABOGROUP O 2018    RH POS 2018     Immunization History   Administered Date(s) Administered   • Influenza Vaccine Adult  2017   • Influenza Vaccine Quad Inj (Pf) 10/10/2018   • Tdap Vaccine 2018       Assessment/Plan  Peggy Pollard is a 18 y.o.  postop day 2 s/p , Low Transverse for Severe preeclampsia - doing well, s/p magnesium sulfate, blood pressures trending towards normal range, no severe features    1. Post care: meeting all goals  2. Hemodynamics: stable  3. Pain: controlled  4. PNL:   Lab Results   Component Value Date    RH POS 2018    RUBELLAIGG 8.2018     5. Method of Feeding: plans to breastfeed  6. Method of Contraception: undecided  7. Vaccinations:   Immunization History   Administered Date(s) Administered   • Influenza Vaccine Adult  2017   • Influenza  Vaccine Quad Inj (Pf) 10/10/2018   • Tdap Vaccine 09/13/2018     8. Disposition: home postop day 3   9. Staple removal on POD#3    No new Assessment & Plan notes have been filed under this hospital service since the last note was generated.  Service: Obstetrics & Gynecology       VTE prophylaxis: ambulating    Marjorie Corcoran M.D.

## 2018-11-28 PROCEDURE — A9270 NON-COVERED ITEM OR SERVICE: HCPCS | Performed by: OBSTETRICS & GYNECOLOGY

## 2018-11-28 PROCEDURE — A9270 NON-COVERED ITEM OR SERVICE: HCPCS | Performed by: STUDENT IN AN ORGANIZED HEALTH CARE EDUCATION/TRAINING PROGRAM

## 2018-11-28 PROCEDURE — 700112 HCHG RX REV CODE 229: Performed by: OBSTETRICS & GYNECOLOGY

## 2018-11-28 PROCEDURE — 700102 HCHG RX REV CODE 250 W/ 637 OVERRIDE(OP): Performed by: OBSTETRICS & GYNECOLOGY

## 2018-11-28 PROCEDURE — 770002 HCHG ROOM/CARE - OB PRIVATE (112)

## 2018-11-28 PROCEDURE — 700102 HCHG RX REV CODE 250 W/ 637 OVERRIDE(OP): Performed by: STUDENT IN AN ORGANIZED HEALTH CARE EDUCATION/TRAINING PROGRAM

## 2018-11-28 RX ORDER — NIFEDIPINE 30 MG/1
30 TABLET, EXTENDED RELEASE ORAL
Status: DISCONTINUED | OUTPATIENT
Start: 2018-11-28 | End: 2018-11-29

## 2018-11-28 RX ORDER — FERROUS SULFATE 325(65) MG
325 TABLET ORAL
Status: DISCONTINUED | OUTPATIENT
Start: 2018-11-28 | End: 2018-11-30 | Stop reason: HOSPADM

## 2018-11-28 RX ADMIN — SALINE NASAL SPRAY 2 SPRAY: 1.5 SOLUTION NASAL at 06:28

## 2018-11-28 RX ADMIN — OXYCODONE HYDROCHLORIDE 5 MG: 5 TABLET ORAL at 06:25

## 2018-11-28 RX ADMIN — DOCUSATE SODIUM 100 MG: 100 CAPSULE, LIQUID FILLED ORAL at 08:43

## 2018-11-28 RX ADMIN — IBUPROFEN 800 MG: 800 TABLET, FILM COATED ORAL at 16:47

## 2018-11-28 RX ADMIN — OXYCODONE HYDROCHLORIDE 10 MG: 10 TABLET ORAL at 10:29

## 2018-11-28 RX ADMIN — FERROUS SULFATE TAB 325 MG (65 MG ELEMENTAL FE) 325 MG: 325 (65 FE) TAB at 11:51

## 2018-11-28 RX ADMIN — FERROUS SULFATE TAB 325 MG (65 MG ELEMENTAL FE) 325 MG: 325 (65 FE) TAB at 18:28

## 2018-11-28 RX ADMIN — IBUPROFEN 800 MG: 800 TABLET, FILM COATED ORAL at 08:43

## 2018-11-28 RX ADMIN — Medication 1 TABLET: at 06:25

## 2018-11-28 RX ADMIN — OXYCODONE HYDROCHLORIDE 5 MG: 5 TABLET ORAL at 16:48

## 2018-11-28 RX ADMIN — SALINE NASAL SPRAY 2 SPRAY: 1.5 SOLUTION NASAL at 10:29

## 2018-11-28 RX ADMIN — GUAIFENESIN 200 MG: 100 SOLUTION ORAL at 06:28

## 2018-11-28 RX ADMIN — OXYCODONE HYDROCHLORIDE 10 MG: 10 TABLET ORAL at 01:03

## 2018-11-28 RX ADMIN — IBUPROFEN 800 MG: 800 TABLET, FILM COATED ORAL at 01:03

## 2018-11-28 RX ADMIN — FERROUS SULFATE TAB 325 MG (65 MG ELEMENTAL FE) 325 MG: 325 (65 FE) TAB at 08:43

## 2018-11-28 RX ADMIN — NIFEDIPINE 30 MG: 30 TABLET, FILM COATED, EXTENDED RELEASE ORAL at 13:52

## 2018-11-28 ASSESSMENT — PAIN SCALES - GENERAL
PAINLEVEL_OUTOF10: 3
PAINLEVEL_OUTOF10: 8
PAINLEVEL_OUTOF10: 8
PAINLEVEL_OUTOF10: 9
PAINLEVEL_OUTOF10: 2
PAINLEVEL_OUTOF10: 5
PAINLEVEL_OUTOF10: 7
PAINLEVEL_OUTOF10: 6

## 2018-11-28 NOTE — LACTATION NOTE
Called to room to assess operation of breast pump by Radha De Jesus, Primary RN.  Instructed MOB on pump operation and pump settings which are: 80 CPM down to 60 after 2 minutes/suction set to comfort at 30%/pumps for 15 minutes.  MOB denied pain and rubbing of nipples with pump use.  Flange fit of 25 mm appears appropriate.  Also, provided verbal and written instructions on cleaning of pump parts.    Breastfeeding plan initiated due to infant's weight loss of 11.54% and is as follows:  1.  Put infant to breasts at beginning of each feeding session.  2.  Supplement infant with formula and/or EBM according to [facility] supplementation guidelines.  MOB provided with a copy of guidelines along with instructions on use.  3.  Pump to protect milk supply.    MOB verbalized understanding of all information provided to her and denied having any further questions at this time.  Encouraged MOB to call for lactation assistance as needed.

## 2018-11-28 NOTE — LACTATION NOTE
This note was copied from a baby's chart.  Met with mother whose baby is now 3 days old. Mom had a . Baby was started on a supplementation  plan last night because weight went from being down 7% to 12 %. When asked about plan mother told me that she is breastfeeding baby and then supplementing and pumping occasionally but not after each feeding. She didn't really understand how to progress with the plan so I wrote out the plan using dates and times for her to change the amounts needed.  Her right nipple has a compression stripe, the left is scabbed but doesn't hurt as much as the right. Baby is asleep right now. Will come back for another feeding to observe latch.    1500-Mother showed me how she latches baby to breast. She started to use the scissor hold on the breast and I asked her to try cross-cradle and move her hand to compress the breast to help baby get a deeper latch. She did this and baby latched. Mom pulled baby's head back so latch was more shallow. Had her redo the latch and baby latched deeper. Mom states it doesn't hurt. We pressed baby's chin into her breast to help deepen latch. Once Mom's milk volume increases I think latching will easier. Mom will continue to supplement. Weight will be rechecked tonight. Needs follow up before discharge tomorrow.

## 2018-11-28 NOTE — CARE PLAN
Problem: Altered physiologic condition related to postoperative  delivery  Goal: Patient physiologically stable as evidenced by normal lochia, palpable uterine involution and vital signs within normal limits  Outcome: PROGRESSING AS EXPECTED  Fundus firm,lochia scant.    Problem: Potential for postpartum infection related to surgical incision, compromised uterine condition, urinary tract or respiratory compromise  Goal: Patient will be afebrile and free from signs and symptoms of infection  Outcome: PROGRESSING AS EXPECTED  Afebrile,no s/s of infection noted.Abdominal incision clean,dry and intact,no drainage noted,staples intact.    Problem: Alteration in comfort related to surgical incision and/or after birth pains  Goal: Patient verbalizes acceptable pain level  Outcome: PROGRESSING AS EXPECTED  Medicated with Oxy IR 10 mg.for incision pain with good relief as verbalized by patient.Ambulating and voiding well without difficulty.

## 2018-11-28 NOTE — PROGRESS NOTES
[facility] POSTPARTUM PROGRESS NOTE    PATIENT ID:  NAME:  Peggy Pollard  MRN:               [id number]  YOB: 2000     18 y.o. female admitted  now   at 39w1d POD#3 s/p primary  due to preeclampsia with severe features. Currently denies headache, vision changes, nausea, vomiting.      Subjective:   Abdominal pain: moderate  Ambulating: yes  tolerating liquids: yes  tolerating regular diet: yes  Flatus: yes  BM: no  Bleeding: scant lochia  Voiding: yes  Dizziness: no  Breast feeding: yes  Breast tenderness: minimal    Objective:    Vitals:    18 2000 18 0000 18 0015 18 0500   BP: 150/96 (!) 166/102 152/98 150/98   Pulse: (!) 106 100 (!) 108 (!) 108   Resp:    Temp: 36.7 °C (98 °F) 37.2 °C (98.9 °F)  36.5 °C (97.7 °F)   TempSrc: Temporal Temporal  Temporal   SpO2: 93% 96%  97%   Weight:       Height:         General: No acute distress, resting comfortably in bed.  HEENT: normocephalic, nontraumatic, EOMI  Cardiovascular: Heart RRR with no murmurs, rubs or gallops. Distal Pulses 2+  Respiratory: symmetric chest expansion, lungs CTA bilaterally with no wheezes rales or rhonci  Abdomen: soft, mildly tender around incision which is clean, dry and intact, fundus firm, +BS  Genitourinary: lochia light, denies excessive vaginal bleeding  Musculoskeletal: strength 5/5 in four extremities, no calf tenderness  Neuro: no focal deficits noted      Recent Labs      18   1318  18   0415   WBC  13.8*  16.0*   RBC  4.41  3.65*   HEMOGLOBIN  11.8*  9.6*   HEMATOCRIT  35.5*  30.2*   MCV  80.5*  82.7   MCH  26.8*  26.3*   PLATELETCT  169  142*   MPV  9.9  9.5   NEUTSPOLYS  74.30*   --    LYMPHOCYTES  12.20*   --    MONOCYTES  12.20   --    EOSINOPHILS  0.40   --    BASOPHILS  0.20   --    RBCMORPHOLO  Present   --      Recent Labs      18   1318   SODIUM  135   POTASSIUM  4.0   CHLORIDE  108   CO2  18*   GLUCOSE  83   BUN  8       Current Meds:    Current Facility-Administered Medications   Medication Dose Frequency Provider Last Rate Last Dose   • ferrous sulfate tablet 325 mg  325 mg TID WITH MEALS Niharika Monroe M.D.       • sodium chloride (OCEAN) 0.65 % nasal spray 2 Spray  2 Spray Q2HRS PRN Marjorie Corcoran M.D.   2 [place] at 11/27/18 0853   • guaiFENesin (ROBITUSSIN) 100 MG/5ML solution 200 mg  10 mL Q4HRS PRN Marjorie Corcoarn M.D.   200 mg at 11/27/18 1929   • benzocaine-menthol (CEPACOL) lozenge 1 Lozenge  1 Lozenge Q2HRS PRN Marjorie Corcoran M.D.       • ondansetron (ZOFRAN ODT) dispertab 4 mg  4 mg Q6HRS PRN Rekha Manuel M.D.        Or   • ondansetron (ZOFRAN) syringe/vial injection 4 mg  4 mg Q6HRS PRN Rekha Manuel M.D.   4 mg at 11/26/18 0645   • oxytocin (PITOCIN) infusion (for postpartum)   mL/hr Continuous Rekha Manuel M.D. 75 mL/hr at 11/26/18 0000 75 mL/hr at 11/26/18 0000   • ibuprofen (MOTRIN) tablet 800 mg  800 mg Q8HRS PRN Rekha Manuel M.D.       • acetaminophen (TYLENOL) tablet 325 mg  325 mg Q4HRS PRN Rekha Manuel M.D.       • LR infusion   PRN Rekha Manuel M.D.       • PRN oxytocin (PITOCIN) (20 Units/1000 mL) PRN for excessive uterine bleeding - See Admin Instr  125-999 mL/hr Once PRN Rekha Manuel M.D.       • docusate sodium (COLACE) capsule 100 mg  100 mg BID PRN Rekha Manuel M.D.   100 mg at 11/27/18 0739   • bisacodyl (DULCOLAX) suppository 10 mg  10 mg PRN Rekha Manuel M.D.       • magnesium hydroxide (MILK OF MAGNESIA) suspension 30 mL  30 mL Q6HRS PRN Rekha Manuel M.D.       • simethicone (MYLICON) chewable tab 80 mg  80 mg 4X/DAY PRN Rekha Manuel M.D.       • prenatal plus vitamin (STUARTNATAL 1+1) 27-1 MG tablet 1 Tab  1 Tab QAM Rekha Manuel M.D.   1 Tab at 11/28/18 0625   • ibuprofen (MOTRIN) tablet 800 mg  800 mg Q8HRS PRN Rekha Manuel M.D.   800 mg at 11/28/18 0103   • acetaminophen (TYLENOL) tablet 325 mg  325 mg Q4HRS PRN Rekha Manuel M.D.       • oxyCODONE immediate-release  (ROXICODONE) tablet 5 mg  5 mg Q4HRS PRN Rekha Manuel M.D.   5 mg at 18 0625   • oxyCODONE immediate release (ROXICODONE) tablet 10 mg  10 mg Q4HRS PRN Rekha Manuel M.D.   10 mg at 18 0103   • morphine (pf) 10 mg/mL injection 4 mg  4 mg Q3HRS PRN Rekha Manuel M.D.       • ondansetron (ZOFRAN) syringe/vial injection 4 mg  4 mg Q6HRS PRN Rekha Manuel M.D.        Or   • ondansetron (ZOFRAN ODT) dispertab 4 mg  4 mg Q6HRS PRN Rekha Manuel M.D.       • diphenhydrAMINE (BENADRYL) tablet/capsule 25 mg  25 mg Q6HRS PRN Rekha Manuel M.D.        Or   • diphenhydrAMINE (BENADRYL) injection 25 mg  25 mg Q6HRS PRN Rekha Manuel M.D.       • lactated ringers infusion   Continuous Rekha Manuel M.D. 75 mL/hr at 18 1800     • oxytocin (PITOCIN) infusion (for induction)  0.5-20 susy-units/min Continuous Rekha Manuel M.D.   Stopped at 18 1920     Last reviewed on 2018 10:21 PM by Andreia Kwok-Wily, RGiannaN.        Assessment:  18 y.o. female  at 39w1d POD#3 s/p primary      Plan:   1. Patient has had some elevated blood pressures: 150s-160/ off magnesium  2. Will continue to monitor blood pressure today  3. Hb 9.6 - will start iron supplement  4. Continue routine post partum care  5. Possible discharge this afternoon or tomorrow    Anticipate D/C home on POD#3-4    Niharika Monroe M.D.     OB attending:   I have discussed Peggy Pollard w/ Dr. Monroe and agree w/ documentation by her.  On review of Bps high mild range mostly with 1 severe overnight.  Will start on procardia 30mg XL daily now and monitor Bps on this, anticipate discharge tomorrow if adequate BP control.    Elyse Blakely MD  [facility], Women's Health

## 2018-11-28 NOTE — PROGRESS NOTES
Isadora Ledbetter CNM notified of patient's BPs = 150/96 @ 2000 and 166/102 @ midnight.Ordered to repeat BP check in 15 minutes and call if > 160/110.

## 2018-11-28 NOTE — PROGRESS NOTES
Pt resting in bed. Infant in in open crib in room. Pt instructed to call for assessment of latching infant. Call bell in reach.

## 2018-11-28 NOTE — LACTATION NOTE
Infant re-weighed on infant scale in NBN.  Weight is 3.523 kg.  Diaper changed which contained void and stool x 1 each.  Infant spitty and removed clear, mucous-like fluid from infant's mouth with bulb syringe.  Infant's lips were slightly blue when attempting to bring up clear fluid.  Infant not crying with touch stimulation.  Flicked heels of infant and infant began to cry.  Infant's O2 saturation checked and found to be between 96-99% with a heart rate of 120.  Breathing was even and non-labored.  Primary RN, Radha De Jesus, updated on the above occurrence.    2310-  Provided MOB with instruction on how to perform paced bottle feeding.  Infant fed 20 ml of Similac formula and 5 ml of EBM.  Infant did not nipple well and took 20 minutes to complete feeding.  Infant burped and fell asleep after feeding.  No signs of distress observed in infant during and after feeding.  Infant remained pink in color and breathing remained even and non-labored.  Reviewed feeding plan with MOB.  Next feeding is due at 0200 or sooner if infant wakes up before then displaying hunger cues.    Primary RN, Radha De Jesus, updated on how infant nippled during feeding of formula and EBM.    MOB has [facility] and is seen at the [facility] office on [address].  MOB will call [facility] tomorrow to obtain a hospital grade breast pump for home use.    MOB verbalized understanding of all information provided to her and denied having any further questions at this time.  Encouraged MOB to call for observance of latch by Primary RN at next feeding.

## 2018-11-29 PROCEDURE — 700102 HCHG RX REV CODE 250 W/ 637 OVERRIDE(OP): Performed by: OBSTETRICS & GYNECOLOGY

## 2018-11-29 PROCEDURE — 700112 HCHG RX REV CODE 229: Performed by: OBSTETRICS & GYNECOLOGY

## 2018-11-29 PROCEDURE — A9270 NON-COVERED ITEM OR SERVICE: HCPCS | Performed by: OBSTETRICS & GYNECOLOGY

## 2018-11-29 PROCEDURE — A9270 NON-COVERED ITEM OR SERVICE: HCPCS | Performed by: STUDENT IN AN ORGANIZED HEALTH CARE EDUCATION/TRAINING PROGRAM

## 2018-11-29 PROCEDURE — 700102 HCHG RX REV CODE 250 W/ 637 OVERRIDE(OP): Performed by: STUDENT IN AN ORGANIZED HEALTH CARE EDUCATION/TRAINING PROGRAM

## 2018-11-29 PROCEDURE — 770002 HCHG ROOM/CARE - OB PRIVATE (112)

## 2018-11-29 RX ORDER — PSEUDOEPHEDRINE HCL 30 MG
100 TABLET ORAL 2 TIMES DAILY PRN
Qty: 60 CAP | Refills: 5 | Status: SHIPPED | OUTPATIENT
Start: 2018-11-29 | End: 2020-06-15

## 2018-11-29 RX ORDER — NIFEDIPINE 60 MG/1
60 TABLET, EXTENDED RELEASE ORAL
Status: DISCONTINUED | OUTPATIENT
Start: 2018-11-30 | End: 2018-11-30 | Stop reason: HOSPADM

## 2018-11-29 RX ORDER — IBUPROFEN 800 MG/1
800 TABLET ORAL EVERY 8 HOURS PRN
Qty: 30 TAB | Refills: 5 | Status: SHIPPED | OUTPATIENT
Start: 2018-11-29 | End: 2019-01-04

## 2018-11-29 RX ORDER — FERROUS SULFATE 325(65) MG
325 TABLET ORAL
Qty: 30 TAB | Refills: 5 | Status: SHIPPED | OUTPATIENT
Start: 2018-11-29 | End: 2020-06-15

## 2018-11-29 RX ORDER — OXYCODONE HYDROCHLORIDE 5 MG/1
5 TABLET ORAL EVERY 4 HOURS PRN
Qty: 15 TAB | Refills: 0 | Status: SHIPPED | OUTPATIENT
Start: 2018-11-29 | End: 2018-12-06

## 2018-11-29 RX ORDER — NIFEDIPINE 30 MG/1
30 TABLET, EXTENDED RELEASE ORAL 2 TIMES DAILY
Status: COMPLETED | OUTPATIENT
Start: 2018-11-29 | End: 2018-11-29

## 2018-11-29 RX ADMIN — OXYCODONE HYDROCHLORIDE 5 MG: 5 TABLET ORAL at 22:19

## 2018-11-29 RX ADMIN — OXYCODONE HYDROCHLORIDE 5 MG: 5 TABLET ORAL at 02:08

## 2018-11-29 RX ADMIN — OXYCODONE HYDROCHLORIDE 5 MG: 5 TABLET ORAL at 08:44

## 2018-11-29 RX ADMIN — DOCUSATE SODIUM 100 MG: 100 CAPSULE, LIQUID FILLED ORAL at 08:44

## 2018-11-29 RX ADMIN — IBUPROFEN 800 MG: 800 TABLET, FILM COATED ORAL at 22:19

## 2018-11-29 RX ADMIN — OXYCODONE HYDROCHLORIDE 5 MG: 5 TABLET ORAL at 15:01

## 2018-11-29 RX ADMIN — NIFEDIPINE 30 MG: 30 TABLET, FILM COATED, EXTENDED RELEASE ORAL at 08:44

## 2018-11-29 RX ADMIN — Medication 1 TABLET: at 04:45

## 2018-11-29 RX ADMIN — IBUPROFEN 800 MG: 800 TABLET, FILM COATED ORAL at 11:33

## 2018-11-29 RX ADMIN — IBUPROFEN 800 MG: 800 TABLET, FILM COATED ORAL at 02:08

## 2018-11-29 RX ADMIN — FERROUS SULFATE TAB 325 MG (65 MG ELEMENTAL FE) 325 MG: 325 (65 FE) TAB at 08:44

## 2018-11-29 RX ADMIN — FERROUS SULFATE TAB 325 MG (65 MG ELEMENTAL FE) 325 MG: 325 (65 FE) TAB at 17:29

## 2018-11-29 RX ADMIN — NIFEDIPINE 30 MG: 30 TABLET, FILM COATED, EXTENDED RELEASE ORAL at 17:29

## 2018-11-29 RX ADMIN — FERROUS SULFATE TAB 325 MG (65 MG ELEMENTAL FE) 325 MG: 325 (65 FE) TAB at 11:33

## 2018-11-29 ASSESSMENT — PAIN SCALES - GENERAL
PAINLEVEL_OUTOF10: 9
PAINLEVEL_OUTOF10: 8
PAINLEVEL_OUTOF10: 0
PAINLEVEL_OUTOF10: 5
PAINLEVEL_OUTOF10: 5
PAINLEVEL_OUTOF10: 6
PAINLEVEL_OUTOF10: 8

## 2018-11-29 NOTE — PROGRESS NOTES
Pt states nipples are sore. Pt will pump for next feeding and bottle feed back MBM or similac 55cc.

## 2018-11-29 NOTE — LACTATION NOTE
This note was copied from a baby's chart.  Reviewed home feeding plan, mother verbalizes plan and expresses understanding. She does not want to latch infant at this time due to painful nipple damage and she plans to pump and bottle feed mix of expressed breast milk and formula every 2.5-3 hours per supplemental feeding volume guidelines. Instructed to  [facility] hospital grade pump today, provided hand pump in case mother does not get to [facility] office today. Mother denies questions/concerns.

## 2018-11-29 NOTE — PROGRESS NOTES
Pt reminded to call wic this morning for breast pump or formula assistance and follow up appointment. Pt on phone with wic when nurse left room. Pt has Lydia berrios.

## 2018-11-29 NOTE — PROGRESS NOTES
Spring hand pump given to pt. Pt was shown how to use it. Pt states understanding. Pt feeding infant appropriate amounts at scheduled time this morning. Will continue to monitor.

## 2018-11-29 NOTE — PROGRESS NOTES
Report received from night nurse Shaylee that pt was needing reminders to feed infant on schedule and not to sleep with infant in bed.  Pt was sleeping with infant in bed this morning when nurses came in for shift change report. Pt had been educated yesterday regarding dangers of sleeping with infant in bed.    Subjective:      Patient ID: Ashleigh Bo is a 60 y.o. male. Patient presents with: Annual Exam    He is well and really no c/o  He is on the meds  He did get the flu shot at work    YOB: 1962    Date of Visit:  2/6/2018     -- Oxycodone -- Itching    Current Outpatient Prescriptions:  levothyroxine (SYNTHROID) 75 MCG tablet, TAKE ONE TABLET DAILY, Disp: 30 tablet, Rfl: 0    No current facility-administered medications for this visit.       ---------------------------               02/06/18                      1354         ---------------------------   BP:          124/78         Site:       Left Arm        Position:     Sitting        Cuff Size:  Medium Adult      Pulse:         76           Temp:   98.2 °F (36.8 °C)   TempSrc:      Oral          Weight: 164 lb (74.4 kg)    Height:  5' 6\" (1.676 m)   ---------------------------  Body mass index is 26.47 kg/m². Wt Readings from Last 3 Encounters:  02/06/18 : 164 lb (74.4 kg)  02/07/17 : 158 lb 6.4 oz (71.8 kg)  11/04/16 : 159 lb 6.4 oz (72.3 kg)    BP Readings from Last 3 Encounters:  02/06/18 : 124/78  02/07/17 : 122/82  11/04/16 : 118/78          Review of Systems   Constitutional: Negative for appetite change, chills, fatigue, fever and unexpected weight change. HENT: Negative for ear pain, sore throat, tinnitus, trouble swallowing and voice change. Eyes: Negative for pain. Respiratory: Negative for cough, chest tightness and shortness of breath. No hemoptysis   Cardiovascular: Negative for chest pain, palpitations and leg swelling. Gastrointestinal: Negative for abdominal distention, abdominal pain, blood in stool, constipation, diarrhea, nausea and vomiting. No gerd no dysphagia   Endocrine: Negative for polydipsia and polyuria. Genitourinary: Negative for difficulty urinating, dysuria, frequency, hematuria, scrotal swelling and testicular pain.    Musculoskeletal: Negative for back pain and neck pain. Skin: Negative for rash. Neurological: Negative for dizziness and headaches. Hematological: Negative for adenopathy. Does not bruise/bleed easily. Objective:   Physical Exam   Constitutional: He appears well-developed and well-nourished. No distress. HENT:   Head: Normocephalic and atraumatic. Right Ear: Tympanic membrane and ear canal normal.   Left Ear: Tympanic membrane and ear canal normal.   Nose: Nose normal.   Mouth/Throat: Uvula is midline, oropharynx is clear and moist and mucous membranes are normal. No oral lesions. Eyes: No scleral icterus. Neck: Full passive range of motion without pain. Neck supple. No thyroid mass and no thyromegaly present. Cardiovascular: Normal rate, regular rhythm and normal heart sounds. Exam reveals no gallop and no friction rub. No murmur heard. No edema   Pulmonary/Chest: Effort normal and breath sounds normal. No accessory muscle usage. No tachypnea. No respiratory distress. He has no decreased breath sounds. He has no wheezes. He has no rhonchi. He has no rales. Abdominal: Soft. Normal appearance and bowel sounds are normal. He exhibits no distension, no abdominal bruit, no pulsatile midline mass and no mass. There is no hepatosplenomegaly. There is no tenderness. There is no rigidity and no guarding. Genitourinary: Rectum normal and prostate normal. Rectal exam shows no mass, no tenderness and anal tone normal. Prostate is not enlarged and not tender. Genitourinary Comments: Prostate is smooth symmetrical no nodule no pain and normal size   Lymphadenopathy:        Head (right side): No submental, no submandibular, no preauricular and no posterior auricular adenopathy present. Head (left side): No submental, no submandibular, no preauricular and no posterior auricular adenopathy present. He has no cervical adenopathy. Right: No supraclavicular adenopathy present.         Left: No supraclavicular

## 2018-11-29 NOTE — PROGRESS NOTES
Pt stated she does not want rubella vaccine. Discussed importance getting vaccinated before another pregnancy. Pt states understanding.

## 2018-11-29 NOTE — DISCHARGE SUMMARY
Discharge Summary:      Peggy Pollard      Admit Date:   2018  Discharge Date:  2018     Admitting diagnosis:  Pregnancy  Indication for care in labor or delivery  Discharge Diagnosis: Status post  for severe Pre E.  Pregnancy Complications: preeclampsia  Tubal Ligation:  no   Denies any severe headaches, changes in vision, RUQ pain, swelling in face/abdomen or any other issues.      History:  Past Medical History:   Diagnosis Date   • Anemia in pregnancy, third trimester 10/10/2018   • Substance abuse (HCC)     last used marijuana yesterday after had stopped for 1 month. pt states she had done methe  few times states she used meth yesterday 18.      OB History    Para Term  AB Living   2 1 1 0 1 1   SAB TAB Ectopic Molar Multiple Live Births   0 0 0 0 0 1      # Outcome Date GA Lbr Jose/2nd Weight Sex Delivery Anes PTL Lv   2 Term 18 39w1d  4.02 kg (8 lb 13.8 oz) F CS-LTranv EPI N ANALI   1 AB 2017 7w0d             Birth Comments: passed on its own           Patient has no known allergies.  Patient Active Problem List    Diagnosis Date Noted   • Anemia in pregnancy, third trimester 10/10/2018   • Abnormal pregnancy US 2018   • Circumvallate placenta 2018   • Rubella non-immune status, antepartum 2018   • Supervision of other normal pregnancy, antepartum 2018   • History of marijuana use 2018   • History of methamphetamine use 2018        Hospital Course:   18 y.o. , now para 2, was admitted with the above mentioned diagnosis, underwent Primary  severe Pre E,  for severe Pre E. Patient postpartum course was unremarkable, with progressive advancement in diet , ambulation and toleration of oral analgesia. Patient without complaints today and desires discharge.      Vitals:    18 1600 18 2000 18 0000 18 0400   BP: 147/87 157/97 153/94 141/88   Pulse: 99 96 (!) 101 (!)  101   Resp: 18 18 16 18   Temp: 36.6 °C (97.9 °F) 36.2 °C (97.2 °F) 37.1 °C (98.8 °F) 36.8 °C (98.2 °F)   TempSrc: Temporal Temporal Temporal Temporal   SpO2: 100% 97% 95% 96%   Weight:       Height:           Current Facility-Administered Medications   Medication Dose   • ferrous sulfate tablet 325 mg  325 mg   • NIFEdipine SR (PROCARDIA-XL) tablet 30 mg  30 mg   • sodium chloride (OCEAN) 0.65 % nasal spray 2 Spray  2 Spray   • guaiFENesin (ROBITUSSIN) 100 MG/5ML solution 200 mg  10 mL   • benzocaine-menthol (CEPACOL) lozenge 1 Lozenge  1 Lozenge   • ondansetron (ZOFRAN ODT) dispertab 4 mg  4 mg    Or   • ondansetron (ZOFRAN) syringe/vial injection 4 mg  4 mg   • oxytocin (PITOCIN) infusion (for postpartum)   mL/hr   • ibuprofen (MOTRIN) tablet 800 mg  800 mg   • acetaminophen (TYLENOL) tablet 325 mg  325 mg   • LR infusion     • PRN oxytocin (PITOCIN) (20 Units/1000 mL) PRN for excessive uterine bleeding - See Admin Instr  125-999 mL/hr   • docusate sodium (COLACE) capsule 100 mg  100 mg   • bisacodyl (DULCOLAX) suppository 10 mg  10 mg   • magnesium hydroxide (MILK OF MAGNESIA) suspension 30 mL  30 mL   • simethicone (MYLICON) chewable tab 80 mg  80 mg   • prenatal plus vitamin (STUARTNATAL 1+1) 27-1 MG tablet 1 Tab  1 Tab   • ibuprofen (MOTRIN) tablet 800 mg  800 mg   • acetaminophen (TYLENOL) tablet 325 mg  325 mg   • oxyCODONE immediate-release (ROXICODONE) tablet 5 mg  5 mg   • oxyCODONE immediate release (ROXICODONE) tablet 10 mg  10 mg   • morphine (pf) 10 mg/mL injection 4 mg  4 mg   • ondansetron (ZOFRAN) syringe/vial injection 4 mg  4 mg    Or   • ondansetron (ZOFRAN ODT) dispertab 4 mg  4 mg   • diphenhydrAMINE (BENADRYL) tablet/capsule 25 mg  25 mg    Or   • diphenhydrAMINE (BENADRYL) injection 25 mg  25 mg   • lactated ringers infusion     • oxytocin (PITOCIN) infusion (for induction)  0.5-20 susy-units/min       Exam:  Breast Exam: negative  Abdomen: Abdomen soft, non-tender. BS normal. No  masses,  No organomegaly  Fundus Non Tender: yes  Incision: dry and intact  Perineum: perineum intact  Extremity: extremities, peripheral pulses and reflexes normal, no edema, redness or tenderness in the calves or thighs     Labs:         Activity:   Discharge to home  Pelvic Rest x 6 weeks    Assessment:  normal postpartum course  Discharge Assessment: No areas of skin breakdown/redness; surgical incision intact/healing     Follow up: .TPC 1 week for incision check and BP follow up.   Pre E warning s/sx reviewed      Discharge Meds:   Current Outpatient Prescriptions   Medication Sig Dispense Refill   • ibuprofen (MOTRIN) 800 MG Tab Take 1 Tab by mouth every 8 hours as needed (For cramping after delivery; do not give if patient is receiving ketorolac (Toradol)). 30 Tab 5   • oxyCODONE immediate-release (ROXICODONE) 5 MG Tab Take 1 Tab by mouth every four hours as needed for up to 7 days. 15 Tab 0   • ferrous sulfate 325 (65 Fe) MG tablet Take 1 Tab by mouth 3 times a day, with meals. 30 Tab 5   • docusate sodium 100 MG Cap Take 100 mg by mouth 2 times a day as needed for Constipation. 60 Cap 5       MOISE Rogel

## 2018-11-29 NOTE — CARE PLAN
Problem: Altered physiologic condition related to postoperative  delivery  Goal: Patient physiologically stable as evidenced by normal lochia, palpable uterine involution and vital signs within normal limits  Fundus firm, lochia light    Problem: Potential for postpartum infection related to surgical incision, compromised uterine condition, urinary tract or respiratory compromise  Goal: Patient will be afebrile and free from signs and symptoms of infection  No s/s of infection

## 2018-11-29 NOTE — DISCHARGE INSTRUCTIONS
POSTPARTUM DISCHARGE INSTRUCTIONS FOR MOM    YOB: 2000   Age: 18 y.o.               Admit Date: 2018     Discharge Date: 2018  Attending Doctor:  Rekha Manuel M.D.                  Allergies:  Patient has no known allergies.    Discharged to home by car. Discharged via wheelchair, hospital escort: Yes.  Special equipment needed: Not Applicable  Belongings with: Personal  Be sure to schedule a follow-up appointment with your primary care doctor or any specialists as instructed.     Discharge Plan:   Influenza Vaccine Indication: Not indicated: Previously immunized this influenza season and > 8 years of age    REASONS TO CALL YOUR OBSTETRICIAN:  1.   Persistent fever or shaking chills (Temperature higher than 100.4)  2.   Heavy bleeding (soaking more than 1 pad per hour); Passing clots  3.   Foul odor from vagina  4.   Mastitis (Breast infection; breast pain, chills, fever, redness)  5.   Urinary pain, burning or frequency  6.   Episiotomy infection  7.   Abdominal incision infection  8.   Severe depression longer than 24 hours    HAND WASHING  · Prior to handling the baby.  · Before breastfeeding or bottle feeding baby.  · After using the bathroom or changing the baby's diaper.    WOUND CARE  Ask your physician for additional care instructions.  In general:    ·  Incision:      · Keep clean and dry.    · Do NOT lift anything heavier than your baby for up to 6 weeks.    · There should not be any opening or pus.      VAGINAL CARE  · Nothing inside vagina for 6 weeks: no sexual intercourse, tampons or douching.  · Bleeding may continue for 2-4 weeks.  Amount may vary.    · Call your physician for heavy bleeding which means soaking more than 1 pad per hour    BIRTH CONTROL  · It is possible to become pregnant at any time after delivery and while breastfeeding.  · Plan to discuss a method of birth control with your physician at your follow up visit. visit.    DIET AND  "ELIMINATION  · Eating more fiber (bran cereal, fruits, and vegetables) and drinking plenty of fluids will help to avoid constipation.  · Urinary frequency after childbirth is normal.    POSTPARTUM BLUES  During the first few days after birth, you may experience a sense of the \"blues\" which may include impatience, irritability or even crying.  These feeling come and go quickly.  However, as many as 1 in 10 women experience emotional symptoms known as postpartum depression.    Postpartum depression:  May start as early as the second or third day after delivery or take several weeks or months to develop.  Symptoms of \"blues\" are present, but are more intense:  Crying spells; loss of appetite; feelings of hopelessness or loss of control; fear of touching the baby; over concern or no concern at all about the baby; little or no concern about your own appearance/caring for yourself; and/or inability to sleep or excessive sleeping.  Contact your physician if you are experiencing any of these symptoms.    Crisis Hotline:  · [place] Crisis Hotline:  2-824-VOFWMBU  Or 1-449.454.1554  · [place] Crisis Hotline:  1-416.104.4319  Or 519-391-6772    PREVENTING SHAKEN BABY:  If you are angry or stressed, PUT THE BABY IN THE CRIB, step away, take some deep breaths, and wait until you are calm to care for the baby.  DO NOT SHAKE THE BABY.  You are not alone, call a supporter for help.    · Crisis Call Center 24/7 crisis line 205-143-5810 or 1-705.697.2143  · You can also text them, text \"ANSWER\" to 953905    QUIT SMOKING/TOBACCO USE:  I understand the use of any tobacco products increases my chance of suffering from future heart disease and could cause other illnesses which may shorten my life. Quitting the use of tobacco products is the single most important thing I can do to improve my health. For further information on smoking / tobacco cessation call a Toll Free Quit Line at 1-881.878.5823 (*[employer] [place]) or " 1-977.531.4334 (American Lung Association) or you can access the web based program at www.lungusa.org.    · [place] Tobacco Users Help Line:  (113) 873-8667       Toll Free: 1-459.652.1234  · Quit Tobacco Program [facility] (557)987-7244    DEPRESSION / SUICIDE RISK:  As you are discharged from this [facility], it is important to learn how to keep safe from harming yourself.    Recognize the warning signs:  · Abrupt changes in personality, positive or negative- including increase in energy   · Giving away possessions  · Change in eating patterns- significant weight changes-  positive or negative  · Change in sleeping patterns- unable to sleep or sleeping all the time   · Unwillingness or inability to communicate  · Depression  · Unusual sadness, discouragement and loneliness  · Talk of wanting to die  · Neglect of personal appearance   · Rebelliousness- reckless behavior  · Withdrawal from people/activities they love  · Confusion- inability to concentrate     If you or a loved one observes any of these behaviors or has concerns about self-harm, here's what you can do:  · Talk about it- your feelings and reasons for harming yourself  · Remove any means that you might use to hurt yourself (examples: pills, rope, extension cords, firearm)  · Get professional help from the community (Mental Health, Substance Abuse, psychological counseling)  · Do not be alone:Call your Safe Contact- someone whom you trust who will be there for you.  · Call your local CRISIS HOTLINE 885-5663 or 226-738-0489  · Call your local Children's Mobile Crisis Response Team [place] (785) 091-6398 or www.MakerBot  · Call the toll free National Suicide Prevention Hotlines   · National Suicide Prevention Lifeline 531-938-[facility] (4240)  · National Hope Line Network 800-SUICIDE (322-4121)    DISCHARGE SURVEY:  Thank you for choosing [facility].  We hope we provided you with very good care.  You may be  receiving a survey in the mail.  Please fill it out.  Your opinion is valuable to us.    ADDITIONAL EDUCATIONAL MATERIALS GIVEN TO PATIENT:        My signature on this form indicates that:  1.  I have reviewed and understand the above information  2.  My questions regarding this information have been answered to my satisfaction.  3.  I have formulated a plan with my discharge nurse to obtain my prescribed medication for home.

## 2018-11-30 VITALS
SYSTOLIC BLOOD PRESSURE: 143 MMHG | BODY MASS INDEX: 36.54 KG/M2 | HEIGHT: 64 IN | TEMPERATURE: 98.5 F | DIASTOLIC BLOOD PRESSURE: 80 MMHG | HEART RATE: 96 BPM | WEIGHT: 214 LBS | OXYGEN SATURATION: 96 % | RESPIRATION RATE: 20 BRPM

## 2018-11-30 PROCEDURE — A9270 NON-COVERED ITEM OR SERVICE: HCPCS | Performed by: OBSTETRICS & GYNECOLOGY

## 2018-11-30 PROCEDURE — 700102 HCHG RX REV CODE 250 W/ 637 OVERRIDE(OP): Performed by: OBSTETRICS & GYNECOLOGY

## 2018-11-30 PROCEDURE — 700112 HCHG RX REV CODE 229: Performed by: OBSTETRICS & GYNECOLOGY

## 2018-11-30 PROCEDURE — 700102 HCHG RX REV CODE 250 W/ 637 OVERRIDE(OP): Performed by: STUDENT IN AN ORGANIZED HEALTH CARE EDUCATION/TRAINING PROGRAM

## 2018-11-30 PROCEDURE — A9270 NON-COVERED ITEM OR SERVICE: HCPCS | Performed by: STUDENT IN AN ORGANIZED HEALTH CARE EDUCATION/TRAINING PROGRAM

## 2018-11-30 RX ADMIN — Medication 1 TABLET: at 06:00

## 2018-11-30 RX ADMIN — OXYCODONE HYDROCHLORIDE 5 MG: 5 TABLET ORAL at 09:15

## 2018-11-30 RX ADMIN — IBUPROFEN 800 MG: 800 TABLET, FILM COATED ORAL at 09:15

## 2018-11-30 RX ADMIN — NIFEDIPINE 60 MG: 60 TABLET, FILM COATED, EXTENDED RELEASE ORAL at 06:00

## 2018-11-30 RX ADMIN — FERROUS SULFATE TAB 325 MG (65 MG ELEMENTAL FE) 325 MG: 325 (65 FE) TAB at 09:15

## 2018-11-30 RX ADMIN — DOCUSATE SODIUM 100 MG: 100 CAPSULE, LIQUID FILLED ORAL at 09:15

## 2018-11-30 RX ADMIN — OXYCODONE HYDROCHLORIDE 5 MG: 5 TABLET ORAL at 13:19

## 2018-11-30 ASSESSMENT — PAIN SCALES - GENERAL
PAINLEVEL_OUTOF10: 8
PAINLEVEL_OUTOF10: 8
PAINLEVEL_OUTOF10: 5

## 2018-11-30 NOTE — DISCHARGE SUMMARY
Discharge Summary:      Peggy Pollard      Admit Date:   2018  Discharge Date:  2018     Admitting diagnosis:  Pregnancy  Indication for care in labor or delivery  Discharge Diagnosis: Status post  for pre eclampsia with severe features  Pregnancy Complications: Pre eclampsia with severe features  Tubal Ligation:  No  Denies headache, nausea, vomiting, swelling in face/hands, and other issues        History:  Past Medical History:   Diagnosis Date   • Anemia in pregnancy, third trimester 10/10/2018   • Substance abuse (HCC)     last used marijuana yesterday after had stopped for 1 month. pt states she had done methe  few times states she used meth yesterday 18.      OB History    Para Term  AB Living   2 1 1 0 1 1   SAB TAB Ectopic Molar Multiple Live Births   0 0 0 0 0 1      # Outcome Date GA Lbr Jose/2nd Weight Sex Delivery Anes PTL Lv   2 Term 18 39w1d  4.02 kg (8 lb 13.8 oz) F CS-LTranv EPI N ANALI   1 AB 2017 7w0d             Birth Comments: passed on its own           Patient has no known allergies.  Patient Active Problem List    Diagnosis Date Noted   • Anemia in pregnancy, third trimester 10/10/2018   • Abnormal pregnancy US 2018   • Circumvallate placenta 2018   • Rubella non-immune status, antepartum 2018   • Supervision of other normal pregnancy, antepartum 2018   • History of marijuana use 2018   • History of methamphetamine use 2018        Hospital Course:   18 y.o. , now , was admitted with the above mentioned diagnosis, underwent primary . Patient postpartum course was remarkable for contiued elevated blood pressures with need to initiate nifedipine. Otherwise unremarkable with progressive advancement in diet, ambulation and toleration of oral analgesia. Patient without complaints today and desires discharge.      Vitals:    18 1800 18 2000 18 0000 18  0400   BP: 142/91 152/96 142/84 144/83   Pulse: (!) 102 (!) 105 (!) 104 86   Resp: 19 18 18 17   Temp: 36.3 °C (97.4 °F) 36.3 °C (97.3 °F) 36.3 °C (97.4 °F) 36.7 °C (98 °F)   TempSrc: Temporal Temporal Temporal Temporal   SpO2: 96% 93% 96% 94%   Weight:       Height:           Current Facility-Administered Medications   Medication Dose   • NIFEdipine SR (PROCARDIA-XL) tablet 60 mg  60 mg   • ferrous sulfate tablet 325 mg  325 mg   • sodium chloride (OCEAN) 0.65 % nasal spray 2 Spray  2 Spray   • guaiFENesin (ROBITUSSIN) 100 MG/5ML solution 200 mg  10 mL   • benzocaine-menthol (CEPACOL) lozenge 1 Lozenge  1 Lozenge   • ondansetron (ZOFRAN ODT) dispertab 4 mg  4 mg    Or   • ondansetron (ZOFRAN) syringe/vial injection 4 mg  4 mg   • oxytocin (PITOCIN) infusion (for postpartum)   mL/hr   • ibuprofen (MOTRIN) tablet 800 mg  800 mg   • acetaminophen (TYLENOL) tablet 325 mg  325 mg   • LR infusion     • PRN oxytocin (PITOCIN) (20 Units/1000 mL) PRN for excessive uterine bleeding - See Admin Instr  125-999 mL/hr   • docusate sodium (COLACE) capsule 100 mg  100 mg   • bisacodyl (DULCOLAX) suppository 10 mg  10 mg   • magnesium hydroxide (MILK OF MAGNESIA) suspension 30 mL  30 mL   • simethicone (MYLICON) chewable tab 80 mg  80 mg   • prenatal plus vitamin (STUARTNATAL 1+1) 27-1 MG tablet 1 Tab  1 Tab   • ibuprofen (MOTRIN) tablet 800 mg  800 mg   • acetaminophen (TYLENOL) tablet 325 mg  325 mg   • oxyCODONE immediate-release (ROXICODONE) tablet 5 mg  5 mg   • oxyCODONE immediate release (ROXICODONE) tablet 10 mg  10 mg   • morphine (pf) 10 mg/mL injection 4 mg  4 mg   • ondansetron (ZOFRAN) syringe/vial injection 4 mg  4 mg    Or   • ondansetron (ZOFRAN ODT) dispertab 4 mg  4 mg   • diphenhydrAMINE (BENADRYL) tablet/capsule 25 mg  25 mg    Or   • diphenhydrAMINE (BENADRYL) injection 25 mg  25 mg   • lactated ringers infusion     • oxytocin (PITOCIN) infusion (for induction)  0.5-20 susy-units/min       Exam:  Vitals:     11/29/18 1800 11/29/18 2000 11/30/18 0000 11/30/18 0400   BP: 142/91 152/96 142/84 144/83   Pulse: (!) 102 (!) 105 (!) 104 86   Resp: 19 18 18 17   Temp: 36.3 °C (97.4 °F) 36.3 °C (97.3 °F) 36.3 °C (97.4 °F) 36.7 °C (98 °F)   TempSrc: Temporal Temporal Temporal Temporal   SpO2: 96% 93% 96% 94%   Weight:       Height:         General: No acute distress, resting comfortably in bed.  HEENT: normocephalic, nontraumatic, EOMI  Cardiovascular: Heart RRR with no murmurs, rubs or gallops. Distal Pulses 2+  Respiratory: symmetric chest expansion, lungs CTA bilaterally with no wheezes rales or rhonci  Abdomen: soft, mildly tender around incision which is clean, dry and intact, fundus firm, +BS  Genitourinary: lochia light, denies excessive vaginal bleeding  Musculoskeletal: strength 5/5 in four extremities, no calf tenderness  Neuro: no focal deficits noted       Labs:  No results for input(s): WBC, RBC, HEMOGLOBIN, HEMATOCRIT, MCV, MCH, RDW, PLATELETCT, MPV, NEUTSPOLYS, LYMPHOCYTES, MONOCYTES, EOSINOPHILS, BASOPHILS, RBCMORPHOLO in the last 72 hours.       Activity:   Discharge to home  Pelvic Rest x 6 weeks    Assessment:  normal postpartum course     Follow up: [facility] in 5 weeks for vaginal; 1 week for incision and BP check.   To resume daily PNV and iron supplement if needed with hydration.   Patient to RT [facility] or ER if any of the following occur:  Fever over 100.5  Severe abdominal pain  Red streaks or painful masses in the breasts  Foul smelling discharge or lochia  Heavy vaginal bleeding saturating a pad per hour  S/s of PP depression     Discharge Meds:   Current Outpatient Prescriptions   Medication Sig Dispense Refill   • [START ON 12/1/2018] NIFEdipine SR (ADALAT CC) 60 MG CR tablet Take 1 Tab by mouth every day. 30 Tab 0   • ibuprofen (MOTRIN) 800 MG Tab Take 1 Tab by mouth every 8 hours as needed (For cramping after delivery; do not give if patient is receiving ketorolac (Toradol)). 30  Tab 5   • oxyCODONE immediate-release (ROXICODONE) 5 MG Tab Take 1 Tab by mouth every four hours as needed for up to 7 days. 15 Tab 0   • ferrous sulfate 325 (65 Fe) MG tablet Take 1 Tab by mouth 3 times a day, with meals. 30 Tab 5   • docusate sodium 100 MG Cap Take 100 mg by mouth 2 times a day as needed for Constipation. 60 Cap 5       Niharika Monroe M.D.

## 2018-12-12 ENCOUNTER — POST PARTUM (OUTPATIENT)
Dept: OBGYN | Facility: CLINIC | Age: 18
End: 2018-12-12
Payer: MEDICAID

## 2018-12-12 VITALS — DIASTOLIC BLOOD PRESSURE: 76 MMHG | SYSTOLIC BLOOD PRESSURE: 126 MMHG | WEIGHT: 180 LBS | BODY MASS INDEX: 30.9 KG/M2

## 2018-12-12 DIAGNOSIS — Z51.89 VISIT FOR WOUND CHECK: ICD-10-CM

## 2018-12-12 PROCEDURE — 90050 PR POSTPARTUM VISIT: CPT | Performed by: OBSTETRICS & GYNECOLOGY

## 2018-12-12 NOTE — PROGRESS NOTES
"Peggy Pollard Is a 18 y.o.  status post  delivery on 18 for severe pre-eclampsia. Patient is here today for an incision check. Currently the patient complains of \"feeling like her incision is opening\"  No drainage. . She reports Normal  BM.  Normal  appetite without nausea and vomiting. She denies fever. Pain is under good control.  Bottle feeding.  Minimal lochia.  Taking procardia XL 60 mg daily.  No headache or visual changes.     /76   Wt 81.6 kg (180 lb)   LMP 2018   BMI 30.90 kg/m²   ABD Abdomen soft, non-tender. BS normal. No masses or organomegaly  Incision healing normally, dry and intact.      Assessment and plan  Status post  delivery  No complications incision healing well  Followup in 4 weeks for final postpartum checkup  Continue procardia until script finished, F/U for postpartum check 1 week afterwards to assess BP.  Suspect will be able to discontinue BP meds, as no hx of previous htn.       "

## 2018-12-12 NOTE — PROGRESS NOTES
"Pt. Here today for C/S check delivered on 11/25/18  Currently : bottle feeding  Pt. States having pain and \" feels like incision is opening\"      "

## 2019-01-04 ENCOUNTER — POST PARTUM (OUTPATIENT)
Dept: OBGYN | Facility: CLINIC | Age: 19
End: 2019-01-04
Payer: MEDICAID

## 2019-01-04 VITALS — SYSTOLIC BLOOD PRESSURE: 120 MMHG | WEIGHT: 184 LBS | BODY MASS INDEX: 31.58 KG/M2 | DIASTOLIC BLOOD PRESSURE: 78 MMHG

## 2019-01-04 PROBLEM — Z34.80 SUPERVISION OF OTHER NORMAL PREGNANCY, ANTEPARTUM: Status: RESOLVED | Noted: 2018-05-21 | Resolved: 2019-01-04

## 2019-01-04 PROBLEM — O09.899 RUBELLA NON-IMMUNE STATUS, ANTEPARTUM: Status: RESOLVED | Noted: 2018-05-22 | Resolved: 2019-01-04

## 2019-01-04 PROBLEM — O43.119 CIRCUMVALLATE PLACENTA: Status: RESOLVED | Noted: 2018-07-20 | Resolved: 2019-01-04

## 2019-01-04 PROBLEM — O28.3 ABNORMAL PREGNANCY US: Status: RESOLVED | Noted: 2018-08-14 | Resolved: 2019-01-04

## 2019-01-04 PROBLEM — O99.013 ANEMIA IN PREGNANCY, THIRD TRIMESTER: Status: RESOLVED | Noted: 2018-10-10 | Resolved: 2019-01-04

## 2019-01-04 PROBLEM — Z28.39 RUBELLA NON-IMMUNE STATUS, ANTEPARTUM: Status: RESOLVED | Noted: 2018-05-22 | Resolved: 2019-01-04

## 2019-01-04 PROCEDURE — 90050 PR POSTPARTUM VISIT: CPT | Performed by: ADVANCED PRACTICE MIDWIFE

## 2019-01-04 ASSESSMENT — EDINBURGH POSTNATAL DEPRESSION SCALE (EPDS)
I HAVE BEEN SO UNHAPPY THAT I HAVE BEEN CRYING: NO, NEVER
I HAVE LOOKED FORWARD WITH ENJOYMENT TO THINGS: AS MUCH AS I EVER DID
I HAVE BEEN SO UNHAPPY THAT I HAVE HAD DIFFICULTY SLEEPING: NOT AT ALL
I HAVE FELT SCARED OR PANICKY FOR NO GOOD REASON: NO, NOT AT ALL
I HAVE BEEN ANXIOUS OR WORRIED FOR NO GOOD REASON: NO, NOT AT ALL
I HAVE BEEN ABLE TO LAUGH AND SEE THE FUNNY SIDE OF THINGS: AS MUCH AS I ALWAYS COULD
I HAVE BLAMED MYSELF UNNECESSARILY WHEN THINGS WENT WRONG: NOT VERY OFTEN
TOTAL SCORE: 2
THE THOUGHT OF HARMING MYSELF HAS OCCURRED TO ME: NEVER
I HAVE FELT SAD OR MISERABLE: NOT VERY OFTEN
THINGS HAVE BEEN GETTING ON TOP OF ME: NO, I HAVE BEEN COPING AS WELL AS EVER

## 2019-01-04 NOTE — PROGRESS NOTES
Pt here today for postpartum exam.  Operation Date: 11/25/18  Pt states no complaints  Currently: bottle feeding  BCM: pt declines bc, [facility].   Good ph:[phone]/ [phone]  Pap deferred (age)

## 2019-01-04 NOTE — PROGRESS NOTES
Subjective   Subjective:    Peggy Pollard is a 18 y.o. female who presents for her postpartum exam 6 weeks weeks following LTCS. Her prenatal course was preeclampsia with severe features and drug use. Her postpartum course was uncomplicated. She denies dysuria, vaginal bleeding, odor, itching or breast problems. She is bottlefeeding. She desires an nothing for her birth control method. Reports no sex prior to this appointment. Eating a regular diet with difficulty. Bowel movement are Normal.  The patient is not having any pain. Light. Patient Denies Incisional pain, drainage or redness. Patient denies crying spells, irritability, or mood swings. She stopped procardia 1 week ago.   Pap due in 4/2019    Problem List     Patient Active Problem List    Diagnosis Date Noted   • Anemia in pregnancy, third trimester 10/10/2018   • Abnormal pregnancy US 08/14/2018   • Circumvallate placenta 07/20/2018   • Rubella non-immune status, antepartum 05/22/2018   • Supervision of other normal pregnancy, antepartum 05/21/2018   • History of marijuana use 05/21/2018   • History of methamphetamine use 05/21/2018       Objective    EDPS: 2    Lab:  Recent Results (from the past 1008 hour(s))   [place] if suspected rupture of membranes (ROM)    Collection Time: 11/25/18 12:55 PM   Result Value Ref Range    Fern Test On Amniotic Fluid see below Not present   Hold Blood Bank Specimen (Not Tested)    Collection Time: 11/25/18  1:18 PM   Result Value Ref Range    Holding Tube - Bb DONE    CBC WITH DIFFERENTIAL    Collection Time: 11/25/18  1:18 PM   Result Value Ref Range    Nucleated RBC 0.00 /100 WBC    NRBC (Absolute) 0.00 K/uL    Neutrophils-Polys 74.30 (H) 44.00 - 72.00 %    Lymphocytes 12.20 (L) 22.00 - 41.00 %    Monocytes 12.20 0.00 - 13.40 %    Eosinophils 0.40 0.00 - 6.90 %    Basophils 0.20 0.00 - 1.80 %    Immature Granulocytes 0.70 0.00 - 0.90 %    Neutrophils (Absolute) 10.26 (H) 2.00 - 7.15 K/uL    Lymphs  (Absolute) 1.68 1.00 - 4.80 K/uL    Monos (Absolute) 1.68 (H) 0.00 - 0.85 K/uL    Eos (Absolute) 0.06 0.00 - 0.51 K/uL    Baso (Absolute) 0.03 0.00 - 0.12 K/uL    Immature Granulocytes (abs) 0.10 0.00 - 0.11 K/uL    WBC 13.8 (H) 4.8 - 10.8 K/uL    RBC 4.41 4.20 - 5.40 M/uL    Hemoglobin 11.8 (L) 12.0 - 16.0 g/dL    Hematocrit 35.5 (L) 37.0 - 47.0 %    MCV 80.5 (L) 81.4 - 97.8 fL    MCH 26.8 (L) 27.0 - 33.0 pg    MCHC 33.2 (L) 33.6 - 35.0 g/dL    Platelet Count 169 164 - 446 K/uL    MPV 9.9 9.0 - 12.9 fL    Anisocytosis 1+     Microcytosis 1+    PERIPHERAL SMEAR REVIEW    Collection Time: 11/25/18  1:18 PM   Result Value Ref Range    Peripheral Smear Review see below    PLATELET ESTIMATE    Collection Time: 11/25/18  1:18 PM   Result Value Ref Range    Plt Estimation Normal    MORPHOLOGY    Collection Time: 11/25/18  1:18 PM   Result Value Ref Range    RBC Morphology Present    DIFFERENTIAL COMMENT    Collection Time: 11/25/18  1:18 PM   Result Value Ref Range    Comments-Diff see below    URIC ACID    Collection Time: 11/25/18  1:18 PM   Result Value Ref Range    Uric Acid 6.2 1.9 - 8.2 mg/dL   COMP METABOLIC PANEL    Collection Time: 11/25/18  1:18 PM   Result Value Ref Range    Sodium 135 135 - 145 mmol/L    Potassium 4.0 3.6 - 5.5 mmol/L    Chloride 108 96 - 112 mmol/L    Co2 18 (L) 20 - 33 mmol/L    Anion Gap 9.0 0.0 - 11.9    Glucose 83 65 - 99 mg/dL    Bun 8 8 - 22 mg/dL    Creatinine 0.56 0.50 - 1.40 mg/dL    Calcium 8.4 (L) 8.5 - 10.5 mg/dL    AST(SGOT) 20 12 - 45 U/L    ALT(SGPT) 7 2 - 50 U/L    Alkaline Phosphatase 173 (H) 30 - 99 U/L    Total Bilirubin 0.3 0.1 - 1.5 mg/dL    Albumin 2.8 (L) 3.2 - 4.9 g/dL    Total Protein 5.1 (L) 6.0 - 8.2 g/dL    Globulin 2.3 1.9 - 3.5 g/dL    A-G Ratio 1.2 g/dL   ESTIMATED GFR    Collection Time: 11/25/18  1:18 PM   Result Value Ref Range    GFR If African American >60 >60 mL/min/1.73 m 2    GFR If Non African American >60 >60 mL/min/1.73 m 2   Urine drug screen     Collection Time: 11/25/18  1:46 PM   Result Value Ref Range    Amphetamines Urine Negative Negative    Barbiturates Negative Negative    Benzodiazepines Negative Negative    Cocaine Metabolite Negative Negative    Methadone Negative Negative    Opiates Negative Negative    Oxycodone Negative Negative    Phencyclidine -Pcp Negative Negative    Propoxyphene Negative Negative    Cannabinoid Metab Negative Negative   URINALYSIS    Collection Time: 11/25/18  1:46 PM   Result Value Ref Range    Color Yellow     Character Clear     Specific Gravity 1.014 <1.035    Ph 6.5 5.0 - 8.0    Glucose Negative Negative mg/dL    Ketones Negative Negative mg/dL    Protein 300 (A) Negative mg/dL    Bilirubin Negative Negative    Urobilinogen, Urine 0.2 Negative    Nitrite Negative Negative    Leukocyte Esterase Negative Negative    Occult Blood Trace (A) Negative    Micro Urine Req Microscopic    URINE MICROSCOPIC (W/UA)    Collection Time: 11/25/18  1:46 PM   Result Value Ref Range    WBC 0-2 /hpf    RBC 5-10 (A) /hpf    Bacteria Negative None /hpf    Epithelial Cells Few /hpf    Hyaline Cast 0-2 /lpf   SERIUM MAGNESIUM LEVEL 2 HRS AFTER LOADING DOSE INFUSED    Collection Time: 11/25/18  3:28 PM   Result Value Ref Range    Magnesium 3.2 (H) 1.5 - 2.5 mg/dL   SERUM MAGNESIUM LEVELS EVERY 6 HRS UNTIL DESIRED RANGE (5-8 MG/DL) ACHIEVED    Collection Time: 11/25/18  9:37 PM   Result Value Ref Range    Magnesium 5.3 (HH) 1.5 - 2.5 mg/dL   SERUM MAGNESIUM LEVELS EVERY 6 HRS UNTIL DESIRED RANGE (5-8 MG/DL) ACHIEVED    Collection Time: 11/26/18  4:15 AM   Result Value Ref Range    Magnesium 6.0 (HH) 1.5 - 2.5 mg/dL   CBC without differential    Collection Time: 11/26/18  4:15 AM   Result Value Ref Range    WBC 16.0 (H) 4.8 - 10.8 K/uL    RBC 3.65 (L) 4.20 - 5.40 M/uL    Hemoglobin 9.6 (L) 12.0 - 16.0 g/dL    Hematocrit 30.2 (L) 37.0 - 47.0 %    MCV 82.7 81.4 - 97.8 fL    MCH 26.3 (L) 27.0 - 33.0 pg    MCHC 31.8 (L) 33.6 - 35.0 g/dL     Platelet Count 142 (L) 164 - 446 K/uL    MPV 9.5 9.0 - 12.9 fL   PERIPHERAL SMEAR REVIEW    Collection Time: 11/26/18  4:15 AM   Result Value Ref Range    Peripheral Smear Review see below    SERUM MAGNESIUM LEVELS EVERY 6 HRS UNTIL DESIRED RANGE (5-8 MG/DL) ACHIEVED    Collection Time: 11/26/18  9:11 AM   Result Value Ref Range    Magnesium 6.7 (HH) 1.5 - 2.5 mg/dL   SERUM MAGNESIUM LEVELS EVERY 6 HRS UNTIL DESIRED RANGE (5-8 MG/DL) ACHIEVED    Collection Time: 11/26/18  3:12 PM   Result Value Ref Range    Magnesium 7.0 (HH) 1.5 - 2.5 mg/dL     184 lbs  120/78    Objective    Well nourished female in no acute distress  A& O x 3  Heart RRR  LCTAB  No edema noted and pulses WNL bilaterally in lower extremities; no claudication  BS x 4; no guarding or tenderness  Incision clean and dry without erythema  Breasts are soft without erythema or scaling.    Genitourinary: Pelvic exam was deferred per patient request.      Assessment   Assessment:    1. PP examination  2. General Counseling and Advice on Contraception  3. Screening for Postpartum Depression  4. Preeclampsia with Severe Features, delivered    Plan   Plan:    1. Contraceptive counseling reviewed in detail with patient. We reviewed progesterone only methods including mini pills, depo, Nexplanon, and Mirena. Handout provided to patient for Nexplanon. She was educated on need for pap smear/ annual exam after her birthday. Encouraged condom use.   3. Preconception guidance for next pregnancy if applicable. Hypertension risk factors. Folic acid for all women of childbearing age. Patient educated on hypertensive disease in pregnancy at great length.  4.  Follow up needed in 2-3 months.

## 2019-01-14 ENCOUNTER — TELEPHONE (OUTPATIENT)
Dept: OBGYN | Facility: CLINIC | Age: 19
End: 2019-01-14

## 2019-01-14 NOTE — TELEPHONE ENCOUNTER
Pt called re:was advised to call back to set up a GYN appt.  Was transferred to Kathi Pantoja to schedule.

## 2019-10-03 ENCOUNTER — HOSPITAL ENCOUNTER (OUTPATIENT)
Facility: MEDICAL CENTER | Age: 19
End: 2019-10-03
Attending: OBSTETRICS & GYNECOLOGY
Payer: MEDICAID

## 2019-10-03 ENCOUNTER — GYNECOLOGY VISIT (OUTPATIENT)
Dept: OBGYN | Facility: CLINIC | Age: 19
End: 2019-10-03
Payer: MEDICAID

## 2019-10-03 VITALS — BODY MASS INDEX: 32.61 KG/M2 | SYSTOLIC BLOOD PRESSURE: 120 MMHG | DIASTOLIC BLOOD PRESSURE: 80 MMHG | WEIGHT: 190 LBS

## 2019-10-03 DIAGNOSIS — Z30.09 ENCOUNTER FOR OTHER GENERAL COUNSELING OR ADVICE ON CONTRACEPTION: ICD-10-CM

## 2019-10-03 DIAGNOSIS — R10.2 PELVIC PAIN: ICD-10-CM

## 2019-10-03 PROCEDURE — 99213 OFFICE O/P EST LOW 20 MIN: CPT | Performed by: OBSTETRICS & GYNECOLOGY

## 2019-10-03 PROCEDURE — 87591 N.GONORRHOEAE DNA AMP PROB: CPT

## 2019-10-03 PROCEDURE — 87491 CHLMYD TRACH DNA AMP PROBE: CPT

## 2019-10-03 NOTE — NON-PROVIDER
Pt here for ER follow-up for her Ovarian cyst  Pt states the pain comes and goes   Pharmacy verified   Good # [phone]

## 2019-10-03 NOTE — PROGRESS NOTES
Chief Complaint   Patient presents with   • Gynecologic Exam     ER follow-up   Chief complaint: ER follow-up    History of present illness:   19 y.o.  presents with above chief complaint.  Patient seen at [facility] due to pelvic pain.  Underwent ultrasound of the time which showed small amount of free pelvic fluid but no clear masses or any pathology noted.  Negative hCG at that time.    Patient still reporting pelvic pain since then, occurring approximately 3 times per week.  Located in the lower quadrants with no radiation.  Sometimes 6 out of 10 in intensity and described as sharp.  No alleviating, aggravating or associated factors.    When asked, patient would not be surprised if her partner has been unfaithful to her.  No gonorrhea chlamydia testing done at [facility]    ROS: Pertinent positives documented in HPI and all other systems reviewed & are negative    POBHx:  2 para 1-0-1-1, status post primary low transverse .  Pregnancy complicated by preeclampsia with severe features    PGYNHx: None, last pap unsure    All PMH, PSH, meds, allergies, social history and FH reviewed and updated today:  Past Medical History:   Diagnosis Date   • Anemia in pregnancy, third trimester 10/10/2018   • Substance abuse (HCC)     last used marijuana yesterday after had stopped for 1 month. pt states she had done methe  few times states she used meth yesterday 18.        Past Surgical History:   Procedure Laterality Date   • PRIMARY C SECTION N/A 2018    Procedure: PRIMARY C SECTION;  Surgeon: Rekha Manuel M.D.;  Location: LABOR AND DELIVERY;  Service: Obstetrics       Allergies: No Known Allergies    Social History     Socioeconomic History   • Marital status: Single     Spouse name: Not on file   • Number of children: Not on file   • Years of education: 10   • Highest education level: Not on file   Occupational History   • Occupation: production  associate      Employer: [employer]   Social Needs   • Financial resource strain: Not on file   • Food insecurity:     Worry: Not on file     Inability: Not on file   • Transportation needs:     Medical: Not on file     Non-medical: Not on file   Tobacco Use   • Smoking status: Never Smoker   • Smokeless tobacco: Never Used   • Tobacco comment: marijuana, currently   Substance and Sexual Activity   • Alcohol use: No   • Drug use: Yes     Types: Inhaled, Marijuana, Methamphetamines     Comment: pt states last time she used meth and marijuana was yesterday 05/20/18   • Sexual activity: Yes     Partners: Male     Birth control/protection: Pill, Condom     Comment: Plans pills and condoms PP.   Lifestyle   • Physical activity:     Days per week: Not on file     Minutes per session: Not on file   • Stress: Not on file   Relationships   • Social connections:     Talks on phone: Not on file     Gets together: Not on file     Attends Jehovah's witness service: Not on file     Active member of club or organization: Not on file     Attends meetings of clubs or organizations: Not on file     Relationship status: Not on file   • Intimate partner violence:     Fear of current or ex partner: Not on file     Emotionally abused: Not on file     Physically abused: Not on file     Forced sexual activity: Not on file   Other Topics Concern   • Not on file   Social History Narrative    ** Merged History Encounter **            Family History   Problem Relation Age of Onset   • Diabetes Father        Physical exam:  /80 (BP Location: Right arm, Patient Position: Sitting)   Wt 86.2 kg (190 lb)     GENERAL APPEARANCE: healthy, alert, no distress  NECK nontender, no masses, thyromegaly or nodules  HEART RRR with normal S1 and S2 ,no murmurs, no gallops, no peripheral edema  LUNG clear to auscultation, normal respiratory effort  ABDOMEN Abdomen soft, non-tender. BS normal. No masses,  No organomegaly  FEMALE GYN: normal female external  genitalia without lesions, BUS normal without lesions, vaginal mucosa pink and moist, no vaginal discharge noted, cervix normal in appearance without lesions, no CMT, urethra is normal without discharge or scarring, no bladder fullness or masses, normal anus and perineum. Uterus mobile, normal size, but mildly tender to palpation. Ovaries normal size and mildly tender bilaterally. No palpable masses.  No significant tenderness noted.  No inguinal hernia present.  EXTREMITIES:negative clubbing, cyanosis, edema    NEURO Awake, alert and oriented x 3, Normal gait, no sensory deficits  SKIN No rashes, or ulcers or lesions seen  PSYCHIATRIC: Patient shows appropriate affect, is alert and oriented x3, intact judgment and insight.      Assessment:  1. Pelvic pain  Chlamydia/GC PCR Urine Or Swab    URINALYSIS,CULTURE IF INDICATED    US-PELVIC COMPLETE (TRANSABDOMINAL/TRANSVAGINAL) (COMBO)       Plan:    Gonorrhea/chlamydia testing obtained today.    Urinalysis also ordered.    Follow-up ultrasound also ordered.    Plan of care will be dependent on what is found on ultrasound and laboratory testing.  Discussed with the patient if all testing is negative, that hormonal management is a good starting point.    Discussed with patient today various forms of birth control available. We reviewed birth control pills, Depo-provera, NuvaRing, IUDs (both hormonal and non-hormonal), and Nexplanon.  Risks, benefits, and side effects to each method discussed in detail.  Also discussed with patient the barrier methods (condoms) available for contraception and prevention of STDs.  After discussion of all the available methods, patient elects for Nexplanon.

## 2020-06-15 ENCOUNTER — HOSPITAL ENCOUNTER (EMERGENCY)
Facility: MEDICAL CENTER | Age: 20
End: 2020-06-16
Attending: EMERGENCY MEDICINE
Payer: MEDICAID

## 2020-06-15 ENCOUNTER — APPOINTMENT (OUTPATIENT)
Dept: RADIOLOGY | Facility: MEDICAL CENTER | Age: 20
End: 2020-06-15
Attending: EMERGENCY MEDICINE
Payer: MEDICAID

## 2020-06-15 DIAGNOSIS — B96.89 BACTERIAL VAGINITIS: ICD-10-CM

## 2020-06-15 DIAGNOSIS — F22 DELUSIONS OF PARASITOSIS (HCC): ICD-10-CM

## 2020-06-15 DIAGNOSIS — N76.0 BACTERIAL VAGINITIS: ICD-10-CM

## 2020-06-15 DIAGNOSIS — R10.2 PELVIC PAIN: ICD-10-CM

## 2020-06-15 DIAGNOSIS — N83.202 CYST OF LEFT OVARY: ICD-10-CM

## 2020-06-15 LAB
ALBUMIN SERPL BCP-MCNC: 4.2 G/DL (ref 3.2–4.9)
ALBUMIN/GLOB SERPL: 1.7 G/DL
ALP SERPL-CCNC: 96 U/L (ref 30–99)
ALT SERPL-CCNC: 10 U/L (ref 2–50)
AMPHET UR QL SCN: NEGATIVE
ANION GAP SERPL CALC-SCNC: 10 MMOL/L (ref 7–16)
APPEARANCE UR: CLEAR
AST SERPL-CCNC: 14 U/L (ref 12–45)
BARBITURATES UR QL SCN: NEGATIVE
BASOPHILS # BLD AUTO: 0.3 % (ref 0–1.8)
BASOPHILS # BLD: 0.03 K/UL (ref 0–0.12)
BENZODIAZ UR QL SCN: NEGATIVE
BILIRUB SERPL-MCNC: <0.2 MG/DL (ref 0.1–1.5)
BILIRUB UR QL STRIP.AUTO: NEGATIVE
BUN SERPL-MCNC: 8 MG/DL (ref 8–22)
BZE UR QL SCN: NEGATIVE
CALCIUM SERPL-MCNC: 9 MG/DL (ref 8.5–10.5)
CANNABINOIDS UR QL SCN: NEGATIVE
CHLORIDE SERPL-SCNC: 101 MMOL/L (ref 96–112)
CO2 SERPL-SCNC: 28 MMOL/L (ref 20–33)
COLOR UR: YELLOW
CREAT SERPL-MCNC: 0.49 MG/DL (ref 0.5–1.4)
EOSINOPHIL # BLD AUTO: 0.08 K/UL (ref 0–0.51)
EOSINOPHIL NFR BLD: 0.7 % (ref 0–6.9)
ERYTHROCYTE [DISTWIDTH] IN BLOOD BY AUTOMATED COUNT: 40.3 FL (ref 35.9–50)
GLOBULIN SER CALC-MCNC: 2.5 G/DL (ref 1.9–3.5)
GLUCOSE SERPL-MCNC: 91 MG/DL (ref 65–99)
GLUCOSE UR STRIP.AUTO-MCNC: NEGATIVE MG/DL
HCG SERPL QL: NEGATIVE
HCT VFR BLD AUTO: 43 % (ref 37–47)
HGB BLD-MCNC: 13.9 G/DL (ref 12–16)
IMM GRANULOCYTES # BLD AUTO: 0.04 K/UL (ref 0–0.11)
IMM GRANULOCYTES NFR BLD AUTO: 0.3 % (ref 0–0.9)
KETONES UR STRIP.AUTO-MCNC: NEGATIVE MG/DL
LEUKOCYTE ESTERASE UR QL STRIP.AUTO: NEGATIVE
LYMPHOCYTES # BLD AUTO: 2.85 K/UL (ref 1–4.8)
LYMPHOCYTES NFR BLD: 24.8 % (ref 22–41)
MCH RBC QN AUTO: 27.7 PG (ref 27–33)
MCHC RBC AUTO-ENTMCNC: 32.3 G/DL (ref 33.6–35)
MCV RBC AUTO: 85.8 FL (ref 81.4–97.8)
METHADONE UR QL SCN: NEGATIVE
MICRO URNS: NORMAL
MONOCYTES # BLD AUTO: 1.02 K/UL (ref 0–0.85)
MONOCYTES NFR BLD AUTO: 8.9 % (ref 0–13.4)
NEUTROPHILS # BLD AUTO: 7.45 K/UL (ref 2–7.15)
NEUTROPHILS NFR BLD: 65 % (ref 44–72)
NITRITE UR QL STRIP.AUTO: NEGATIVE
NRBC # BLD AUTO: 0 K/UL
NRBC BLD-RTO: 0 /100 WBC
OPIATES UR QL SCN: NEGATIVE
OXYCODONE UR QL SCN: NEGATIVE
PCP UR QL SCN: NEGATIVE
PH UR STRIP.AUTO: 6.5 [PH] (ref 5–8)
PLATELET # BLD AUTO: 242 K/UL (ref 164–446)
PMV BLD AUTO: 9.2 FL (ref 9–12.9)
POC BREATHALIZER: 0 PERCENT (ref 0–0.01)
POTASSIUM SERPL-SCNC: 4 MMOL/L (ref 3.6–5.5)
PROPOXYPH UR QL SCN: NEGATIVE
PROT SERPL-MCNC: 6.7 G/DL (ref 6–8.2)
PROT UR QL STRIP: NEGATIVE MG/DL
RBC # BLD AUTO: 5.01 M/UL (ref 4.2–5.4)
RBC UR QL AUTO: NEGATIVE
SODIUM SERPL-SCNC: 139 MMOL/L (ref 135–145)
SP GR UR STRIP.AUTO: 1
UROBILINOGEN UR STRIP.AUTO-MCNC: 0.2 MG/DL
WBC # BLD AUTO: 11.5 K/UL (ref 4.8–10.8)

## 2020-06-15 PROCEDURE — 87491 CHLMYD TRACH DNA AMP PROBE: CPT | Mod: EDC

## 2020-06-15 PROCEDURE — 99284 EMERGENCY DEPT VISIT MOD MDM: CPT | Mod: EDC

## 2020-06-15 PROCEDURE — 85025 COMPLETE CBC W/AUTO DIFF WBC: CPT | Mod: EDC

## 2020-06-15 PROCEDURE — 81003 URINALYSIS AUTO W/O SCOPE: CPT | Mod: EDC

## 2020-06-15 PROCEDURE — 84703 CHORIONIC GONADOTROPIN ASSAY: CPT | Mod: EDC

## 2020-06-15 PROCEDURE — 76856 US EXAM PELVIC COMPLETE: CPT

## 2020-06-15 PROCEDURE — 87591 N.GONORRHOEAE DNA AMP PROB: CPT | Mod: EDC

## 2020-06-15 PROCEDURE — 80053 COMPREHEN METABOLIC PANEL: CPT | Mod: EDC

## 2020-06-15 PROCEDURE — 87480 CANDIDA DNA DIR PROBE: CPT | Mod: EDC

## 2020-06-15 PROCEDURE — 87660 TRICHOMONAS VAGIN DIR PROBE: CPT | Mod: EDC

## 2020-06-15 PROCEDURE — 302970 POC BREATHALIZER: Mod: EDC | Performed by: EMERGENCY MEDICINE

## 2020-06-15 PROCEDURE — 80307 DRUG TEST PRSMV CHEM ANLYZR: CPT | Mod: EDC

## 2020-06-15 PROCEDURE — 87510 GARDNER VAG DNA DIR PROBE: CPT | Mod: EDC

## 2020-06-15 ASSESSMENT — FIBROSIS 4 INDEX: FIB4 SCORE: 1.17

## 2020-06-15 NOTE — ED TRIAGE NOTES
"..  Chief Complaint   Patient presents with   • Back Pain     Reports her entire back hurts for the last two months. Pt states \"I would like a scan of my whole body to check myself out\". \"I feel like i have maggots and worms in my body\".    • Digit Pain     pt states her toes hurt like someone is \"pulling them to crack them.    • Exposure to STD     possible exposure to STD. Reports pain in pelvis and possible pregnancy test.    ../83   Pulse 98   Temp 36.8 °C (98.2 °F) (Temporal)   Resp 14   Ht 1.626 m (5' 4\")   Wt 90.3 kg (199 lb 1.2 oz)   SpO2 99%   "

## 2020-06-16 VITALS
TEMPERATURE: 98.6 F | HEART RATE: 66 BPM | RESPIRATION RATE: 18 BRPM | WEIGHT: 199.08 LBS | HEIGHT: 64 IN | BODY MASS INDEX: 33.99 KG/M2 | DIASTOLIC BLOOD PRESSURE: 88 MMHG | OXYGEN SATURATION: 98 % | SYSTOLIC BLOOD PRESSURE: 130 MMHG

## 2020-06-16 LAB
C TRACH DNA SPEC QL NAA+PROBE: NEGATIVE
CANDIDA DNA VAG QL PROBE+SIG AMP: NEGATIVE
G VAGINALIS DNA VAG QL PROBE+SIG AMP: POSITIVE
N GONORRHOEA DNA SPEC QL NAA+PROBE: NEGATIVE
SPECIMEN SOURCE: NORMAL
T VAGINALIS DNA VAG QL PROBE+SIG AMP: NEGATIVE

## 2020-06-16 RX ORDER — METRONIDAZOLE 500 MG/1
500 TABLET ORAL 2 TIMES DAILY
Qty: 14 TAB | Refills: 0 | Status: SHIPPED | OUTPATIENT
Start: 2020-06-16 | End: 2020-06-23

## 2020-06-16 NOTE — ED NOTES
Discharge instructions reviewed with patient. No questions at this time. PIV removed. Prescription given to patient. Patient ambulated out of ED with a steady gait.

## 2020-06-16 NOTE — ED PROVIDER NOTES
"ED Provider Note    Scribed for Edson Mccarthy M.D. by Alvarez Molina. 6/15/2020, 7:40 PM.    Primary care provider: Wanda Houston M.D.  Means of arrival: walk-in  History obtained from: patient  History limited by: none    CHIEF COMPLAINT  Chief Complaint   Patient presents with   • Back Pain     Reports her entire back hurts for the last two months. Pt states \"I would like a scan of my whole body to check myself out\". \"I feel like i have maggots and worms in my body\".    • Digit Pain     pt states her toes hurt like someone is \"pulling them to crack them.    • Exposure to STD     possible exposure to STD. Reports pain in pelvis and possible pregnancy test.    • Psych Eval       HPI  Peggy Pollard is a 20 y.o. female who presents to the Emergency Department with multiple concerns, the greatest of which is the sensation of maggots and worms crawling all over herself for the past four months. This has been mostly constant. She states she is unable to see them. She also has complaints of lower back pain, abdominal pain, and nausea. She adds that she also is having numbness to her bilateral feet with an odd sensation. She is concerned about STD's. She denies any vaginal discharge or new sexual partners. Patient is M1. Her LMP was at the beginning of ths month. She denies any incontinence, fever, chills, nausea, vomiting, diarrhea, constipation, shortness of breath, or dysuria. She admits to methamphetamine use with her last use being 3 weeks ago. She reports a family history of diabetes. No history of IV drug use.     PPE Note: I personally donned full PPE for all patient encounters during this visit, including being clean-shaven with an N95 respirator mask, gloves, and eye protection. Scribe remained outside the patient's room and did not have any contact with the patient for the duration of patient encounter.       REVIEW OF SYSTEMS  Pertinent positives include sensation of bugs crawling " "across body, numbness to bilateral feet, lower back pain, abdominal pain, and nausea. Pertinent negatives include vaginal discharge, incontinence, fever, chills, nausea, vomiting, diarrhea, constipation, shortness of breath, or dysuria. All other systems negative.    PAST MEDICAL HISTORY   has a past medical history of Anemia in pregnancy, third trimester (10/10/2018) and Substance abuse (HCC).    SURGICAL HISTORY   has a past surgical history that includes primary c section (N/A, 11/25/2018).    SOCIAL HISTORY  Social History     Tobacco Use   • Smoking status: Never Smoker   • Smokeless tobacco: Never Used   • Tobacco comment: marijuana, currently   Substance Use Topics   • Alcohol use: No   • Drug use: Yes     Types: Inhaled, Marijuana, Methamphetamines     Comment: meth a few weeks ago      Social History     Substance and Sexual Activity   Drug Use Yes   • Types: Inhaled, Marijuana, Methamphetamines    Comment: meth a few weeks ago       FAMILY HISTORY  Family History   Problem Relation Age of Onset   • Diabetes Father        CURRENT MEDICATIONS  Home Medications     Reviewed by Earline Francis R.N. (Registered Nurse) on 06/15/20 at 1622  Med List Status: Not Addressed   Medication Last Dose Status        Patient Sanjay Taking any Medications                       ALLERGIES  No Known Allergies    PHYSICAL EXAM  VITAL SIGNS: /96   Pulse 74   Temp 36.8 °C (98.2 °F) (Temporal)   Resp 18   Ht 1.626 m (5' 4\")   Wt 90.3 kg (199 lb 1.2 oz)   SpO2 100%   BMI 34.17 kg/m²     Constitutional: Well developed, Well nourished, mild distress.   HENT: Normocephalic, Atraumatic, mask in place  Eyes: Conjunctiva normal, No discharge.   Cardiovascular: Normal heart rate, Normal rhythm, No murmurs, equal pulses.   Pulmonary: Normal breath sounds, No respiratory distress, No wheezing, No rales, No rhonchi.  Chest: No chest wall tenderness or deformity.   Abdomen: Soft, Slight suprapubic tenderness, No masses, no " rebound, no guarding.   Genitalia: Normal external genitalia. Small amount of white discharge. Adnexal tenderness on the left, no tenderness on the right. No masses, no cervical motion tenderness. Female nurse chaperone present for examination.  Back: No CVA tenderness. No midline vertebral   Musculoskeletal: No major deformities noted, No tenderness. Distal pulses intact.  2+ DP pulses bilaterally, normal capillary refill time in all 5 toes.  No joint tenderness, no calf tenderness, no cords  Skin: Warm, Dry.  Neurologic: Alert & oriented x 3, Normal motor function,  No focal deficits noted.       LABS  Results for orders placed or performed during the hospital encounter of 06/15/20   URINE DRUG SCREEN   Result Value Ref Range    Amphetamines Urine Negative Negative    Barbiturates Negative Negative    Benzodiazepines Negative Negative    Cocaine Metabolite Negative Negative    Methadone Negative Negative    Opiates Negative Negative    Oxycodone Negative Negative    Phencyclidine -Pcp Negative Negative    Propoxyphene Negative Negative    Cannabinoid Metab Negative Negative   HCG QUAL SERUM   Result Value Ref Range    Beta-Hcg Qualitative Serum Negative Negative   URINALYSIS CULTURE, IF INDICATED    Specimen: Blood   Result Value Ref Range    Color Yellow     Character Clear     Specific Gravity 1.004 <1.035    Ph 6.5 5.0 - 8.0    Glucose Negative Negative mg/dL    Ketones Negative Negative mg/dL    Protein Negative Negative mg/dL    Bilirubin Negative Negative    Urobilinogen, Urine 0.2 Negative    Nitrite Negative Negative    Leukocyte Esterase Negative Negative    Occult Blood Negative Negative    Micro Urine Req see below    CHLAMYDIA & GC BY PCR    Specimen: Genital   Result Value Ref Range    Source Vaginal    VAGINAL PATHOGENS DNA PANEL   Result Value Ref Range    Candida species DNA Probe Negative Negative    Trichamonas vaginalis DNA Probe Negative Negative    Gardnerella vaginalis DNA Probe POSITIVE (A)  Negative   CBC WITH DIFFERENTIAL   Result Value Ref Range    WBC 11.5 (H) 4.8 - 10.8 K/uL    RBC 5.01 4.20 - 5.40 M/uL    Hemoglobin 13.9 12.0 - 16.0 g/dL    Hematocrit 43.0 37.0 - 47.0 %    MCV 85.8 81.4 - 97.8 fL    MCH 27.7 27.0 - 33.0 pg    MCHC 32.3 (L) 33.6 - 35.0 g/dL    RDW 40.3 35.9 - 50.0 fL    Platelet Count 242 164 - 446 K/uL    MPV 9.2 9.0 - 12.9 fL    Neutrophils-Polys 65.00 44.00 - 72.00 %    Lymphocytes 24.80 22.00 - 41.00 %    Monocytes 8.90 0.00 - 13.40 %    Eosinophils 0.70 0.00 - 6.90 %    Basophils 0.30 0.00 - 1.80 %    Immature Granulocytes 0.30 0.00 - 0.90 %    Nucleated RBC 0.00 /100 WBC    Neutrophils (Absolute) 7.45 (H) 2.00 - 7.15 K/uL    Lymphs (Absolute) 2.85 1.00 - 4.80 K/uL    Monos (Absolute) 1.02 (H) 0.00 - 0.85 K/uL    Eos (Absolute) 0.08 0.00 - 0.51 K/uL    Baso (Absolute) 0.03 0.00 - 0.12 K/uL    Immature Granulocytes (abs) 0.04 0.00 - 0.11 K/uL    NRBC (Absolute) 0.00 K/uL   COMP METABOLIC PANEL   Result Value Ref Range    Sodium 139 135 - 145 mmol/L    Potassium 4.0 3.6 - 5.5 mmol/L    Chloride 101 96 - 112 mmol/L    Co2 28 20 - 33 mmol/L    Anion Gap 10.0 7.0 - 16.0    Glucose 91 65 - 99 mg/dL    Bun 8 8 - 22 mg/dL    Creatinine 0.49 (L) 0.50 - 1.40 mg/dL    Calcium 9.0 8.5 - 10.5 mg/dL    AST(SGOT) 14 12 - 45 U/L    ALT(SGPT) 10 2 - 50 U/L    Alkaline Phosphatase 96 30 - 99 U/L    Total Bilirubin <0.2 0.1 - 1.5 mg/dL    Albumin 4.2 3.2 - 4.9 g/dL    Total Protein 6.7 6.0 - 8.2 g/dL    Globulin 2.5 1.9 - 3.5 g/dL    A-G Ratio 1.7 g/dL   ESTIMATED GFR   Result Value Ref Range    GFR If African American >60 >60 mL/min/1.73 m 2    GFR If Non African American >60 >60 mL/min/1.73 m 2   POC BREATHALIZER   Result Value Ref Range    POC Breathalizer 0.00 0.00 - 0.01 Percent      All labs reviewed by me.    RADIOLOGY  US-PELVIC COMPLETE (TRANSABDOMINAL/TRANSVAGINAL) (COMBO)   Final Result         1.  Thickened endometrial stripe, likely proliferative changes given patient age.   2.   Cyst in the left ovary, otherwise unremarkable transvaginal appearance of the pelvis.        The radiologist's interpretation of all radiological studies have been reviewed by me.    COURSE & MEDICAL DECISION MAKING  Pertinent Labs & Imaging studies reviewed. (See chart for details)    Review of past medical records shows the patient has a history of ovarian cysts.     7:40 PM - Patient seen and examined at bedside. Ordered US-pelvic, CBC w/ diff, CMP, HCG qual, UA w/ culture if indicated, chlamydia/GC, vaginal pathogens, POC breathalizer, and UDS to evaluate her symptoms. The differential diagnoses include but are not limited to: STD, ovarian cyst, ovarian torsion, pregnancy, ectopic, vaginitis.    11:12 PM - Pelvic examination completed as noted above. Female nurse chaperone present for the duration of the examination.    12:24 AM - Patient's diagnostic studies were reviewed. Vaginal pathogens panel returned as positive for gardnerella. Patient provided with a prescription of a Flagyl. Patient was also found to have a cyst on her left ovary on pelvic ultrasound. Patient will be discharged home in stable condition. Advised to return for any new or worsening symptoms.     Medical Decision Making: She presents with multiple complaints.  At this point time I think her vaginal pain and pelvic pain is secondary to her ovarian cyst as well as bacterial vaginitis.  Clinically does not have any cervical motion tenderness. I do not think she has PID.  As far as her bilateral foot numbness this seems to be on the bottom of the feet and toes she has no signs of vascular compromise.  Her electrolytes are unremarkable this may be more of her neuropathy.  I think her delusion of parasitosis secondary to her methamphetamine abuse.  At this point time patient is not suicidal or homicidal showed no destructive behavior because of this.  I will have her patient follow-up with her regular doctor for this.    The patient will return  for new or worsening symptoms and is stable at the time of discharge.    The patient is referred to a primary physician for blood pressure management, diabetic screening, and for all other preventative health concerns.    DISPOSITION:  Patient will be discharged home in stable condition.    FOLLOW UP:  Wanda Houston M.D.  [address] [place] [address]  [place] [id number]  [phone]    Schedule an appointment as soon as possible for a visit in 1 week        OUTPATIENT MEDICATIONS:  Discharge Medication List as of 6/16/2020 12:28 AM      START taking these medications    Details   metroNIDAZOLE (FLAGYL) 500 MG Tab Take 1 Tab by mouth 2 Times a Day for 7 days., Disp-14 Tab,R-0, Print Rx Paper               FINAL IMPRESSION  1. Pelvic pain    2. Cyst of left ovary    3. Bacterial vaginitis    4. Delusions of parasitosis (HCC)          Alvarez TRUJILLO (Scribe), am scribing for, and in the presence of, Edson Mccarthy M.D.    Electronically signed by: Alvarez Molina (Sandra), 6/15/2020    Edson TRUJILLO M.D. personally performed the services described in this documentation, as scribed by Alvarez Molina in my presence, and it is both accurate and complete.    The note accurately reflects work and decisions made by me.  Edson Mccarthy M.D.  6/16/2020  2:38 AM

## 2020-06-16 NOTE — ED NOTES
Pt ambulaotry to ER room. Agree with triage note .Chart up for ERP.     Pt states last meth use about 3 weeks ago.

## 2020-06-16 NOTE — DISCHARGE INSTRUCTIONS
Take all your antibiotics until gone. Return if you have increasing pain, fever, pain moves to the right lower quadrant, weakness or numbness. Do not drink alcohol on flagyl.

## 2023-02-13 NOTE — PROGRESS NOTES
Attempted to contact patient unable to leave a message mailbox full. Manuel from Lab called with critical result of Magnesium = 7.0 at 1635. Critical lab result read back to Manuel.   This critical lab result is within parameters established by Dr. Manuel for this patient

## 2023-07-12 NOTE — PROGRESS NOTES
Dr. Enamorado updated of patient's 2000 blood pressure.  Orders received.  Continue with plan of care.    J-Code:

## (undated) DEVICE — PACK C-SECTION (2EA/CA)

## (undated) DEVICE — STAPLER SKIN DISP - (6/BX 10BX/CA) VISISTAT

## (undated) DEVICE — ELECTRODE DUAL RETURN W/ CORD - (50/PK)

## (undated) DEVICE — CHLORAPREP 26 ML APPLICATOR - ORANGE TINT(25/CA)

## (undated) DEVICE — SUTURE 1 CHROMIC CTX ETHICON - (36PK/BX)

## (undated) DEVICE — SLEEVE, SEQUENTIAL CALF REG

## (undated) DEVICE — DRESSING INTERCEED ABSORBABLE ADHESION BARRIER TC7 (10EA/CA)

## (undated) DEVICE — BLANKET UNDERBODY FULL ACCES - (5/CA)

## (undated) DEVICE — SUTURE 2-0 CHROMIC GUT CT-1 27 (36PK/BX)"

## (undated) DEVICE — WATER IRRIG. STER. 1500 ML - (9/CA)

## (undated) DEVICE — CANISTER SUCTION 3000ML MECHANICAL FILTER AUTO SHUTOFF MEDI-VAC NONSTERILE LF DISP  (40EA/CA)

## (undated) DEVICE — SUTURE 0 VICRYL PLUS CT 36 (36PK/BX)"

## (undated) DEVICE — DETERGENT RENUZYME PLUS 10 OZ PACKET (50/BX)

## (undated) DEVICE — CATHETER IV NON-SAFETY 18 GA X 1 1/4 (50/BX 4BX/CA)

## (undated) DEVICE — SHEATH RO 4F 10CM (10EA/BX)

## (undated) DEVICE — HEAD HOLDER JUNIOR/ADULT

## (undated) DEVICE — GLOVE BIOGEL SZ 8 SURGICAL PF LTX - (50PR/BX 4BX/CA)

## (undated) DEVICE — TRAY SPINAL ANESTHESIA NON-SAFETY (10/CA)

## (undated) DEVICE — KIT  I.V. START (100EA/CA)

## (undated) DEVICE — TUBING CLEARLINK DUO-VENT - C-FLO (48EA/CA)

## (undated) DEVICE — TAPE CLOTH MEDIPORE 6 INCH - (12RL/CA)

## (undated) DEVICE — SUTURE 3-0 VICRYL PLUS CT-1 - 36 INCH (36/BX)

## (undated) DEVICE — SODIUM CHL IRRIGATION 0.9% 1000ML (12EA/CA)

## (undated) DEVICE — TRAY BLADDER CARE W/ 16 FR FOLEY CATHETER STATLOCK  (10/CA)

## (undated) DEVICE — SET EXTENSION WITH 2 PORTS (48EA/CA) ***PART #2C8610 IS A SUBSTITUTE*****

## (undated) DEVICE — SUTURE 0 VICRYL PLUS CT-1 - 36 INCH (36/BX)